# Patient Record
Sex: MALE | Race: WHITE | Employment: FULL TIME | URBAN - METROPOLITAN AREA
[De-identification: names, ages, dates, MRNs, and addresses within clinical notes are randomized per-mention and may not be internally consistent; named-entity substitution may affect disease eponyms.]

---

## 2017-01-16 PROBLEM — E78.2 MIXED HYPERLIPIDEMIA: Status: ACTIVE | Noted: 2017-01-16

## 2017-09-07 ENCOUNTER — OFFICE VISIT (OUTPATIENT)
Dept: FAMILY MEDICINE CLINIC | Age: 62
End: 2017-09-07

## 2017-09-07 VITALS
HEART RATE: 62 BPM | HEIGHT: 74 IN | SYSTOLIC BLOOD PRESSURE: 122 MMHG | WEIGHT: 186 LBS | BODY MASS INDEX: 23.87 KG/M2 | DIASTOLIC BLOOD PRESSURE: 80 MMHG | OXYGEN SATURATION: 97 %

## 2017-09-07 DIAGNOSIS — Z00.00 WELL ADULT EXAM: Primary | ICD-10-CM

## 2017-09-07 DIAGNOSIS — E78.2 MIXED HYPERLIPIDEMIA: ICD-10-CM

## 2017-09-07 DIAGNOSIS — Z12.5 PROSTATE CANCER SCREENING: ICD-10-CM

## 2017-09-07 LAB
A/G RATIO: 1.7 (ref 1.1–2.2)
ALBUMIN SERPL-MCNC: 4.4 G/DL (ref 3.4–5)
ALP BLD-CCNC: 71 U/L (ref 40–129)
ALT SERPL-CCNC: 28 U/L (ref 10–40)
ANION GAP SERPL CALCULATED.3IONS-SCNC: 13 MMOL/L (ref 3–16)
AST SERPL-CCNC: 19 U/L (ref 15–37)
BASOPHILS ABSOLUTE: 0.1 K/UL (ref 0–0.2)
BASOPHILS RELATIVE PERCENT: 0.8 %
BILIRUB SERPL-MCNC: 0.5 MG/DL (ref 0–1)
BILIRUBIN, POC: 0
BLOOD URINE, POC: NORMAL
BUN BLDV-MCNC: 15 MG/DL (ref 7–20)
CALCIUM SERPL-MCNC: 9.5 MG/DL (ref 8.3–10.6)
CHLORIDE BLD-SCNC: 102 MMOL/L (ref 99–110)
CHOLESTEROL, TOTAL: 227 MG/DL (ref 0–199)
CLARITY, POC: CLEAR
CO2: 25 MMOL/L (ref 21–32)
COLOR, POC: YELLOW
CREAT SERPL-MCNC: 0.7 MG/DL (ref 0.8–1.3)
EOSINOPHILS ABSOLUTE: 0.1 K/UL (ref 0–0.6)
EOSINOPHILS RELATIVE PERCENT: 1 %
GFR AFRICAN AMERICAN: >60
GFR NON-AFRICAN AMERICAN: >60
GLOBULIN: 2.6 G/DL
GLUCOSE BLD-MCNC: 80 MG/DL (ref 70–99)
GLUCOSE URINE, POC: 0
HCT VFR BLD CALC: 49.7 % (ref 40.5–52.5)
HDLC SERPL-MCNC: 51 MG/DL (ref 40–60)
HEMOGLOBIN: 16.7 G/DL (ref 13.5–17.5)
KETONES, POC: 0
LDL CHOLESTEROL CALCULATED: 142 MG/DL
LEUKOCYTE EST, POC: 0
LYMPHOCYTES ABSOLUTE: 2 K/UL (ref 1–5.1)
LYMPHOCYTES RELATIVE PERCENT: 32.2 %
MCH RBC QN AUTO: 29.4 PG (ref 26–34)
MCHC RBC AUTO-ENTMCNC: 33.6 G/DL (ref 31–36)
MCV RBC AUTO: 87.6 FL (ref 80–100)
MONOCYTES ABSOLUTE: 0.6 K/UL (ref 0–1.3)
MONOCYTES RELATIVE PERCENT: 8.8 %
NEUTROPHILS ABSOLUTE: 3.6 K/UL (ref 1.7–7.7)
NEUTROPHILS RELATIVE PERCENT: 57.2 %
NITRITE, POC: 0
PDW BLD-RTO: 14.4 % (ref 12.4–15.4)
PH, POC: 5
PLATELET # BLD: 215 K/UL (ref 135–450)
PMV BLD AUTO: 8.9 FL (ref 5–10.5)
POTASSIUM SERPL-SCNC: 4.5 MMOL/L (ref 3.5–5.1)
PROSTATE SPECIFIC ANTIGEN: 2.74 NG/ML (ref 0–4)
PROTEIN, POC: 0
RBC # BLD: 5.68 M/UL (ref 4.2–5.9)
SODIUM BLD-SCNC: 140 MMOL/L (ref 136–145)
SPECIFIC GRAVITY, POC: 1.02
TOTAL PROTEIN: 7 G/DL (ref 6.4–8.2)
TRIGL SERPL-MCNC: 169 MG/DL (ref 0–150)
UROBILINOGEN, POC: 0.2
VLDLC SERPL CALC-MCNC: 34 MG/DL
WBC # BLD: 6.3 K/UL (ref 4–11)

## 2017-09-07 PROCEDURE — 81002 URINALYSIS NONAUTO W/O SCOPE: CPT | Performed by: FAMILY MEDICINE

## 2017-09-07 PROCEDURE — 36415 COLL VENOUS BLD VENIPUNCTURE: CPT | Performed by: FAMILY MEDICINE

## 2017-09-07 PROCEDURE — 99396 PREV VISIT EST AGE 40-64: CPT | Performed by: FAMILY MEDICINE

## 2017-09-07 RX ORDER — ATORVASTATIN CALCIUM 20 MG/1
20 TABLET, FILM COATED ORAL NIGHTLY
Qty: 30 TABLET | Refills: 12 | Status: SHIPPED | OUTPATIENT
Start: 2017-09-07 | End: 2018-09-13 | Stop reason: SDUPTHER

## 2017-09-07 ASSESSMENT — PATIENT HEALTH QUESTIONNAIRE - PHQ9
1. LITTLE INTEREST OR PLEASURE IN DOING THINGS: 0
SUM OF ALL RESPONSES TO PHQ9 QUESTIONS 1 & 2: 0
2. FEELING DOWN, DEPRESSED OR HOPELESS: 0
SUM OF ALL RESPONSES TO PHQ QUESTIONS 1-9: 0

## 2017-09-08 ASSESSMENT — ENCOUNTER SYMPTOMS
EYE PAIN: 0
VOMITING: 0
SHORTNESS OF BREATH: 0
ABDOMINAL PAIN: 0
DIARRHEA: 0
COUGH: 0

## 2018-09-13 ENCOUNTER — OFFICE VISIT (OUTPATIENT)
Dept: FAMILY MEDICINE CLINIC | Age: 63
End: 2018-09-13

## 2018-09-13 VITALS
BODY MASS INDEX: 24.87 KG/M2 | SYSTOLIC BLOOD PRESSURE: 114 MMHG | HEIGHT: 72 IN | DIASTOLIC BLOOD PRESSURE: 60 MMHG | WEIGHT: 183.6 LBS | HEART RATE: 64 BPM

## 2018-09-13 DIAGNOSIS — Z00.00 WELL ADULT EXAM: Primary | ICD-10-CM

## 2018-09-13 DIAGNOSIS — E78.2 MIXED HYPERLIPIDEMIA: ICD-10-CM

## 2018-09-13 DIAGNOSIS — Z98.890 S/P MVR (MITRAL VALVE REPAIR): ICD-10-CM

## 2018-09-13 LAB
A/G RATIO: 2 (ref 1.1–2.2)
ALBUMIN SERPL-MCNC: 4.5 G/DL (ref 3.4–5)
ALP BLD-CCNC: 67 U/L (ref 40–129)
ALT SERPL-CCNC: 27 U/L (ref 10–40)
ANION GAP SERPL CALCULATED.3IONS-SCNC: 15 MMOL/L (ref 3–16)
AST SERPL-CCNC: 20 U/L (ref 15–37)
BASOPHILS ABSOLUTE: 0.1 K/UL (ref 0–0.2)
BASOPHILS RELATIVE PERCENT: 1.2 %
BILIRUB SERPL-MCNC: 0.5 MG/DL (ref 0–1)
BILIRUBIN, POC: 0
BLOOD URINE, POC: NORMAL
BUN BLDV-MCNC: 20 MG/DL (ref 7–20)
CALCIUM SERPL-MCNC: 9.6 MG/DL (ref 8.3–10.6)
CHLORIDE BLD-SCNC: 106 MMOL/L (ref 99–110)
CHOLESTEROL, TOTAL: 134 MG/DL (ref 0–199)
CLARITY, POC: CLEAR
CO2: 24 MMOL/L (ref 21–32)
COLOR, POC: YELLOW
CREAT SERPL-MCNC: 0.8 MG/DL (ref 0.8–1.3)
EOSINOPHILS ABSOLUTE: 0.2 K/UL (ref 0–0.6)
EOSINOPHILS RELATIVE PERCENT: 2.3 %
GFR AFRICAN AMERICAN: >60
GFR NON-AFRICAN AMERICAN: >60
GLOBULIN: 2.2 G/DL
GLUCOSE BLD-MCNC: 83 MG/DL (ref 70–99)
GLUCOSE URINE, POC: 0
HCT VFR BLD CALC: 48 % (ref 40.5–52.5)
HDLC SERPL-MCNC: 53 MG/DL (ref 40–60)
HEMOGLOBIN: 15.9 G/DL (ref 13.5–17.5)
KETONES, POC: 0
LDL CHOLESTEROL CALCULATED: 57 MG/DL
LEUKOCYTE EST, POC: 0
LYMPHOCYTES ABSOLUTE: 2.1 K/UL (ref 1–5.1)
LYMPHOCYTES RELATIVE PERCENT: 30.9 %
MCH RBC QN AUTO: 29 PG (ref 26–34)
MCHC RBC AUTO-ENTMCNC: 33.2 G/DL (ref 31–36)
MCV RBC AUTO: 87.6 FL (ref 80–100)
MONOCYTES ABSOLUTE: 0.6 K/UL (ref 0–1.3)
MONOCYTES RELATIVE PERCENT: 9.6 %
NEUTROPHILS ABSOLUTE: 3.8 K/UL (ref 1.7–7.7)
NEUTROPHILS RELATIVE PERCENT: 56 %
NITRITE, POC: 0
PDW BLD-RTO: 13.8 % (ref 12.4–15.4)
PH, POC: 6
PLATELET # BLD: 184 K/UL (ref 135–450)
PMV BLD AUTO: 9 FL (ref 5–10.5)
POTASSIUM SERPL-SCNC: 4.6 MMOL/L (ref 3.5–5.1)
PROSTATE SPECIFIC ANTIGEN: 3.12 NG/ML (ref 0–4)
PROTEIN, POC: 0
RBC # BLD: 5.48 M/UL (ref 4.2–5.9)
SODIUM BLD-SCNC: 145 MMOL/L (ref 136–145)
SPECIFIC GRAVITY, POC: 1.02
TOTAL PROTEIN: 6.7 G/DL (ref 6.4–8.2)
TRIGL SERPL-MCNC: 119 MG/DL (ref 0–150)
UROBILINOGEN, POC: 0.2
VLDLC SERPL CALC-MCNC: 24 MG/DL
WBC # BLD: 6.7 K/UL (ref 4–11)

## 2018-09-13 PROCEDURE — 36415 COLL VENOUS BLD VENIPUNCTURE: CPT | Performed by: FAMILY MEDICINE

## 2018-09-13 PROCEDURE — 99396 PREV VISIT EST AGE 40-64: CPT | Performed by: FAMILY MEDICINE

## 2018-09-13 PROCEDURE — 81002 URINALYSIS NONAUTO W/O SCOPE: CPT | Performed by: FAMILY MEDICINE

## 2018-09-13 RX ORDER — ATORVASTATIN CALCIUM 20 MG/1
20 TABLET, FILM COATED ORAL NIGHTLY
Qty: 90 TABLET | Refills: 3 | Status: SHIPPED | OUTPATIENT
Start: 2018-09-13 | End: 2019-09-02 | Stop reason: SDUPTHER

## 2018-09-13 ASSESSMENT — ENCOUNTER SYMPTOMS
DIARRHEA: 0
EYE PAIN: 0
BACK PAIN: 1
CONSTIPATION: 0
COUGH: 0
ABDOMINAL PAIN: 0
SHORTNESS OF BREATH: 0
VOMITING: 0
CHEST TIGHTNESS: 0

## 2018-09-13 ASSESSMENT — PATIENT HEALTH QUESTIONNAIRE - PHQ9
SUM OF ALL RESPONSES TO PHQ9 QUESTIONS 1 & 2: 0
2. FEELING DOWN, DEPRESSED OR HOPELESS: 0
1. LITTLE INTEREST OR PLEASURE IN DOING THINGS: 0
SUM OF ALL RESPONSES TO PHQ QUESTIONS 1-9: 0
SUM OF ALL RESPONSES TO PHQ QUESTIONS 1-9: 0

## 2018-09-13 NOTE — PROGRESS NOTES
Head: Normocephalic and atraumatic. Right Ear: External ear normal.   Left Ear: External ear normal.   Mouth/Throat: Oropharynx is clear and moist. No oropharyngeal exudate. Eyes: Conjunctivae and EOM are normal. No scleral icterus. Neck: Neck supple. No tracheal deviation present. No thyromegaly present. Cardiovascular: Normal rate, regular rhythm, normal heart sounds and intact distal pulses. Pulmonary/Chest: Effort normal. No respiratory distress. Abdominal: Soft. Bowel sounds are normal. He exhibits no distension and no mass. There is no tenderness. Musculoskeletal: He exhibits no edema. Lymphadenopathy:     He has no cervical adenopathy. Neurological: He is alert and oriented to person, place, and time. Skin: Skin is warm and dry. Psychiatric: He has a normal mood and affect. Nursing note and vitals reviewed. Assessment:      Encounter Diagnoses   Name Primary?  Well adult exam Yes    Mixed hyperlipidemia     S/P MVR (mitral valve repair)          Plan:      Per orders - await results. Advise low-fat and low sweets diet, also continue regular exercise as he is doing. If labs stable, and overall feeling well, then repeat fasting office visit in 12 months, sooner as needed.         Neville Vidales MD

## 2018-09-16 DIAGNOSIS — E78.2 MIXED HYPERLIPIDEMIA: ICD-10-CM

## 2018-09-18 RX ORDER — ATORVASTATIN CALCIUM 20 MG/1
20 TABLET, FILM COATED ORAL NIGHTLY
Qty: 30 TABLET | Refills: 0 | OUTPATIENT
Start: 2018-09-18

## 2019-09-02 DIAGNOSIS — E78.2 MIXED HYPERLIPIDEMIA: ICD-10-CM

## 2019-09-03 NOTE — TELEPHONE ENCOUNTER
Familia Tony is requesting refill(s) lipitor  Last OV 9/13/18 (pertaining to medication)  LR 9/13/18 (per medication requested)  Next office visit scheduled or attempted Yes   If no, reason:  Pt is scheduled for 9/19/19

## 2019-09-04 RX ORDER — ATORVASTATIN CALCIUM 20 MG/1
TABLET, FILM COATED ORAL
Qty: 90 TABLET | Refills: 3 | Status: SHIPPED | OUTPATIENT
Start: 2019-09-04 | End: 2020-06-24 | Stop reason: SDUPTHER

## 2019-09-19 ENCOUNTER — OFFICE VISIT (OUTPATIENT)
Dept: FAMILY MEDICINE CLINIC | Age: 64
End: 2019-09-19
Payer: COMMERCIAL

## 2019-09-19 VITALS
HEIGHT: 72 IN | HEART RATE: 66 BPM | OXYGEN SATURATION: 98 % | BODY MASS INDEX: 24.76 KG/M2 | DIASTOLIC BLOOD PRESSURE: 60 MMHG | TEMPERATURE: 97.8 F | SYSTOLIC BLOOD PRESSURE: 110 MMHG | WEIGHT: 182.8 LBS

## 2019-09-19 DIAGNOSIS — Z12.5 PROSTATE CANCER SCREENING: ICD-10-CM

## 2019-09-19 DIAGNOSIS — Z00.00 WELL ADULT EXAM: Primary | ICD-10-CM

## 2019-09-19 DIAGNOSIS — Z23 NEED FOR INFLUENZA VACCINATION: ICD-10-CM

## 2019-09-19 LAB
A/G RATIO: 2.1 (ref 1.1–2.2)
ALBUMIN SERPL-MCNC: 4.5 G/DL (ref 3.4–5)
ALP BLD-CCNC: 64 U/L (ref 40–129)
ALT SERPL-CCNC: 27 U/L (ref 10–40)
ANION GAP SERPL CALCULATED.3IONS-SCNC: 13 MMOL/L (ref 3–16)
AST SERPL-CCNC: 22 U/L (ref 15–37)
BASOPHILS ABSOLUTE: 0.1 K/UL (ref 0–0.2)
BASOPHILS RELATIVE PERCENT: 1.2 %
BILIRUB SERPL-MCNC: 0.9 MG/DL (ref 0–1)
BILIRUBIN, POC: 0
BLOOD URINE, POC: NORMAL
BUN BLDV-MCNC: 18 MG/DL (ref 7–20)
CALCIUM SERPL-MCNC: 9.4 MG/DL (ref 8.3–10.6)
CHLORIDE BLD-SCNC: 105 MMOL/L (ref 99–110)
CHOLESTEROL, TOTAL: 137 MG/DL (ref 0–199)
CLARITY, POC: CLEAR
CO2: 24 MMOL/L (ref 21–32)
COLOR, POC: YELLOW
CREAT SERPL-MCNC: 0.9 MG/DL (ref 0.8–1.3)
EOSINOPHILS ABSOLUTE: 0.1 K/UL (ref 0–0.6)
EOSINOPHILS RELATIVE PERCENT: 1.5 %
GFR AFRICAN AMERICAN: >60
GFR NON-AFRICAN AMERICAN: >60
GLOBULIN: 2.1 G/DL
GLUCOSE BLD-MCNC: 79 MG/DL (ref 70–99)
GLUCOSE URINE, POC: 0
HCT VFR BLD CALC: 47.3 % (ref 40.5–52.5)
HDLC SERPL-MCNC: 53 MG/DL (ref 40–60)
HEMOGLOBIN: 15.8 G/DL (ref 13.5–17.5)
KETONES, POC: 0
LDL CHOLESTEROL CALCULATED: 65 MG/DL
LEUKOCYTE EST, POC: 0
LYMPHOCYTES ABSOLUTE: 2 K/UL (ref 1–5.1)
LYMPHOCYTES RELATIVE PERCENT: 32.8 %
MCH RBC QN AUTO: 29.2 PG (ref 26–34)
MCHC RBC AUTO-ENTMCNC: 33.4 G/DL (ref 31–36)
MCV RBC AUTO: 87.3 FL (ref 80–100)
MONOCYTES ABSOLUTE: 0.7 K/UL (ref 0–1.3)
MONOCYTES RELATIVE PERCENT: 11.4 %
NEUTROPHILS ABSOLUTE: 3.2 K/UL (ref 1.7–7.7)
NEUTROPHILS RELATIVE PERCENT: 53.1 %
NITRITE, POC: 0
PDW BLD-RTO: 13.7 % (ref 12.4–15.4)
PH, POC: 5.5
PLATELET # BLD: 194 K/UL (ref 135–450)
PMV BLD AUTO: 8.7 FL (ref 5–10.5)
POTASSIUM SERPL-SCNC: 4.2 MMOL/L (ref 3.5–5.1)
PROSTATE SPECIFIC ANTIGEN: 2.87 NG/ML (ref 0–4)
PROTEIN, POC: 0
RBC # BLD: 5.42 M/UL (ref 4.2–5.9)
SODIUM BLD-SCNC: 142 MMOL/L (ref 136–145)
SPECIFIC GRAVITY, POC: 1.02
TOTAL PROTEIN: 6.6 G/DL (ref 6.4–8.2)
TRIGL SERPL-MCNC: 95 MG/DL (ref 0–150)
UROBILINOGEN, POC: 0.2
VLDLC SERPL CALC-MCNC: 19 MG/DL
WBC # BLD: 6.1 K/UL (ref 4–11)

## 2019-09-19 PROCEDURE — 81002 URINALYSIS NONAUTO W/O SCOPE: CPT | Performed by: FAMILY MEDICINE

## 2019-09-19 PROCEDURE — 90471 IMMUNIZATION ADMIN: CPT | Performed by: FAMILY MEDICINE

## 2019-09-19 PROCEDURE — 99396 PREV VISIT EST AGE 40-64: CPT | Performed by: FAMILY MEDICINE

## 2019-09-19 PROCEDURE — 90686 IIV4 VACC NO PRSV 0.5 ML IM: CPT | Performed by: FAMILY MEDICINE

## 2019-09-19 PROCEDURE — 36415 COLL VENOUS BLD VENIPUNCTURE: CPT | Performed by: FAMILY MEDICINE

## 2019-09-19 ASSESSMENT — PATIENT HEALTH QUESTIONNAIRE - PHQ9
2. FEELING DOWN, DEPRESSED OR HOPELESS: 0
SUM OF ALL RESPONSES TO PHQ QUESTIONS 1-9: 0
SUM OF ALL RESPONSES TO PHQ9 QUESTIONS 1 & 2: 0
1. LITTLE INTEREST OR PLEASURE IN DOING THINGS: 0
SUM OF ALL RESPONSES TO PHQ QUESTIONS 1-9: 0

## 2019-09-19 ASSESSMENT — ENCOUNTER SYMPTOMS
BACK PAIN: 1
COUGH: 0
EYE PAIN: 0
ABDOMINAL PAIN: 0
DIARRHEA: 0
SHORTNESS OF BREATH: 0

## 2019-09-19 NOTE — PROGRESS NOTES
Subjective:      Patient ID: Sully Brown is a 59 y.o. male. HPI   Here for CPE. Only concern is last year, some L flank pain, told could be related to OA, and sometimes L facial tingling, not numb, and sometimes gets L lower back, and sometimes L UE sensation, not a real pain, not definite numbness/tingling. No meds for it - usually less sxs when active. Trying to stay active. Energy level good, and sleeping ok overall, sometimes worse then other nights. Diet - lowfat, likes veggies, low sodium, salads, fish, little beef, more chicken, few sweets, no pop. Exercise -  Not much lately, but wife is quite busy,  at [employer], done by late Sept, so he's now cooking for them both, but will then get back into exercise  - more walking and back/other exercises, some wts too, restart elliptical.  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 Patient sees Dr. Rik Saavedra in Jan, had echo this year. Younger son at [facility], to be done by early next year (?).  12/14/15 - Celine. Review of Systems   Constitutional: Negative for chills, fatigue and fever. HENT: Negative for ear discharge and ear pain. Eyes: Negative for pain and visual disturbance. Respiratory: Negative for cough and shortness of breath. Cardiovascular: Negative for chest pain and palpitations. Gastrointestinal: Negative for abdominal pain and diarrhea. Genitourinary: Negative for difficulty urinating and dysuria. Musculoskeletal: Positive for back pain. Negative for gait problem. Neurological: Positive for numbness (tingling L face, see HPI). Negative for dizziness and weakness. Psychiatric/Behavioral: Negative for dysphoric mood and sleep disturbance. The patient is not nervous/anxious. Objective:   Physical Exam   Constitutional: He is oriented to person, place, and time. He appears well-developed and well-nourished.    HENT:   Head:

## 2019-09-19 NOTE — PROGRESS NOTES
Vaccine Information Sheet, \"Influenza - Inactivated\"  given to Federica Archer, or parent/legal guardian of  Federica Archer and verbalized understanding. Patient responses:    Have you ever had a reaction to a flu vaccine? No  Are you able to eat eggs without adverse effects? Yes  Do you have any current illness? No  Have you ever had Guillian [place]open Syndrome? No    Flu vaccine given per order. Please see immunization tab.

## 2020-01-10 ENCOUNTER — TELEPHONE (OUTPATIENT)
Dept: FAMILY MEDICINE CLINIC | Age: 65
End: 2020-01-10

## 2020-06-24 ENCOUNTER — OFFICE VISIT (OUTPATIENT)
Dept: FAMILY MEDICINE CLINIC | Age: 65
End: 2020-06-24
Payer: COMMERCIAL

## 2020-06-24 VITALS
DIASTOLIC BLOOD PRESSURE: 78 MMHG | WEIGHT: 184.8 LBS | TEMPERATURE: 97.9 F | HEART RATE: 70 BPM | BODY MASS INDEX: 24.49 KG/M2 | HEIGHT: 73 IN | SYSTOLIC BLOOD PRESSURE: 130 MMHG | OXYGEN SATURATION: 98 %

## 2020-06-24 LAB
A/G RATIO: 1.8 (ref 1.1–2.2)
ALBUMIN SERPL-MCNC: 4.2 G/DL (ref 3.4–5)
ALP BLD-CCNC: 70 U/L (ref 40–129)
ALT SERPL-CCNC: 25 U/L (ref 10–40)
ANION GAP SERPL CALCULATED.3IONS-SCNC: 11 MMOL/L (ref 3–16)
AST SERPL-CCNC: 17 U/L (ref 15–37)
BILIRUB SERPL-MCNC: 0.7 MG/DL (ref 0–1)
BUN BLDV-MCNC: 20 MG/DL (ref 7–20)
CALCIUM SERPL-MCNC: 9.4 MG/DL (ref 8.3–10.6)
CHLORIDE BLD-SCNC: 102 MMOL/L (ref 99–110)
CHOLESTEROL, TOTAL: 134 MG/DL (ref 0–199)
CO2: 23 MMOL/L (ref 21–32)
CREAT SERPL-MCNC: 0.8 MG/DL (ref 0.8–1.3)
GFR AFRICAN AMERICAN: >60
GFR NON-AFRICAN AMERICAN: >60
GLOBULIN: 2.3 G/DL
GLUCOSE BLD-MCNC: 86 MG/DL (ref 70–99)
HDLC SERPL-MCNC: 51 MG/DL (ref 40–60)
LDL CHOLESTEROL CALCULATED: 57 MG/DL
POTASSIUM SERPL-SCNC: 4.3 MMOL/L (ref 3.5–5.1)
SODIUM BLD-SCNC: 136 MMOL/L (ref 136–145)
TOTAL PROTEIN: 6.5 G/DL (ref 6.4–8.2)
TRIGL SERPL-MCNC: 131 MG/DL (ref 0–150)
VLDLC SERPL CALC-MCNC: 26 MG/DL

## 2020-06-24 PROCEDURE — 99213 OFFICE O/P EST LOW 20 MIN: CPT | Performed by: FAMILY MEDICINE

## 2020-06-24 PROCEDURE — 90732 PPSV23 VACC 2 YRS+ SUBQ/IM: CPT | Performed by: FAMILY MEDICINE

## 2020-06-24 PROCEDURE — 90471 IMMUNIZATION ADMIN: CPT | Performed by: FAMILY MEDICINE

## 2020-06-24 RX ORDER — ATORVASTATIN CALCIUM 20 MG/1
TABLET, FILM COATED ORAL
Qty: 90 TABLET | Refills: 3 | Status: SHIPPED | OUTPATIENT
Start: 2020-06-24 | End: 2021-06-03

## 2020-06-24 ASSESSMENT — ENCOUNTER SYMPTOMS
SHORTNESS OF BREATH: 0
SINUS PRESSURE: 0
BLOOD IN STOOL: 0
COUGH: 0
EYE PAIN: 0
EYE DISCHARGE: 0
ABDOMINAL PAIN: 0
VOMITING: 0
DIARRHEA: 0
CHEST TIGHTNESS: 0
RHINORRHEA: 0
WHEEZING: 0
CONSTIPATION: 0

## 2020-06-24 ASSESSMENT — PATIENT HEALTH QUESTIONNAIRE - PHQ9
2. FEELING DOWN, DEPRESSED OR HOPELESS: 0
SUM OF ALL RESPONSES TO PHQ9 QUESTIONS 1 & 2: 0
SUM OF ALL RESPONSES TO PHQ QUESTIONS 1-9: 0
SUM OF ALL RESPONSES TO PHQ QUESTIONS 1-9: 0
1. LITTLE INTEREST OR PLEASURE IN DOING THINGS: 0

## 2020-06-24 NOTE — PROGRESS NOTES
Subjective:      Patient ID: Tarik Pyle is a 72 y.o. male. HPI  Chief Complaint   Patient presents with   Lisandra Vaughn Doctor     Patient is here to establish care with , was previously seeing .  Hyperlipidemia     Patient is fasting for blood work. For cholesterol checkup. States taking his medicine daily without any problems. Denies any muscle pain or rash. No weakness. Sees his cardiologist once a year. Denies any chest pain shortness of breath or syncope  Tarik Pyle is a 72 y.o. male with the following history as recorded in EpicCare:  Patient Active Problem List    Diagnosis Date Noted    Mixed hyperlipidemia 01/16/2017    S/P MVR (mitral valve repair) 06/30/2014     Current Outpatient Medications   Medication Sig Dispense Refill    atorvastatin (LIPITOR) 20 MG tablet TAKE 1 TABLET BY MOUTH EVERY NIGHT FOR HIGH CHOLESTEROL 90 tablet 3    aspirin 81 MG tablet Take 81 mg by mouth daily.  Multiple Vitamins-Minerals (THERAPEUTIC MULTIVITAMIN-MINERALS) tablet Take 1 tablet by mouth daily. No current facility-administered medications for this visit. Allergies: Patient has no known allergies. Past Medical History:   Diagnosis Date    Adenomatous colon polyp 2009    Dyslipidemia     History of colon polyps     Status post mitral valve repair 2006     Past Surgical History:   Procedure Laterality Date    COLONOSCOPY  12/04/09    Adenoma    FRACTURE SURGERY Right 1983    fractured humerus, plate    MITRAL VALVE SURGERY  2006    Dr. Kenny Mcpherson follows - Dr. Brianna Flores surgeon, [facility] History   Problem Relation Age of Onset    Kidney Disease Brother     Cancer Neg Hx      Social History     Tobacco Use    Smoking status: Never Smoker    Smokeless tobacco: Never Used   Substance Use Topics    Alcohol use:  Yes     Alcohol/week: 0.0 standard drinks     Comment: rarely - 2-4/month     Vitals:    06/24/20 0831   BP: 130/78   Pulse: 70   Temp: distension. Palpations: Abdomen is soft. Tenderness: There is no abdominal tenderness. There is no guarding or rebound. Musculoskeletal: Normal range of motion. Lymphadenopathy:      Cervical: No cervical adenopathy. Skin:     General: Skin is warm. Neurological:      Mental Status: He is alert and oriented to person, place, and time. Psychiatric:         Behavior: Behavior normal.         Thought Content:  Thought content normal.         Judgment: Judgment normal.         Assessment:      hld- check lipids      Plan:      Orders Placed This Encounter   Procedures    PNEUMOVAX 23 subcutaneous/IM (Pneumococcal polysaccharide vaccine 23-valent >= 3yo)    LIPID PANEL     Order Specific Question:   Is Patient Fasting?/# of Hours     Answer:   12    COMPREHENSIVE METABOLIC PANEL     Orders Placed This Encounter   Medications    atorvastatin (LIPITOR) 20 MG tablet     Sig: TAKE 1 TABLET BY MOUTH EVERY NIGHT FOR HIGH CHOLESTEROL     Dispense:  90 tablet     Refill:  3     rto 3 month INDIGO [facility] [address] RACH, DO

## 2020-10-01 ENCOUNTER — OFFICE VISIT (OUTPATIENT)
Dept: FAMILY MEDICINE CLINIC | Age: 65
End: 2020-10-01
Payer: COMMERCIAL

## 2020-10-01 VITALS
HEIGHT: 72 IN | HEART RATE: 63 BPM | OXYGEN SATURATION: 99 % | TEMPERATURE: 97.5 F | DIASTOLIC BLOOD PRESSURE: 74 MMHG | SYSTOLIC BLOOD PRESSURE: 120 MMHG | WEIGHT: 181.8 LBS | BODY MASS INDEX: 24.62 KG/M2

## 2020-10-01 LAB — PROSTATE SPECIFIC ANTIGEN: 4.46 NG/ML (ref 0–4)

## 2020-10-01 PROCEDURE — 99397 PER PM REEVAL EST PAT 65+ YR: CPT | Performed by: FAMILY MEDICINE

## 2020-10-01 PROCEDURE — 90471 IMMUNIZATION ADMIN: CPT | Performed by: FAMILY MEDICINE

## 2020-10-01 PROCEDURE — 36415 COLL VENOUS BLD VENIPUNCTURE: CPT | Performed by: FAMILY MEDICINE

## 2020-10-01 PROCEDURE — 90694 VACC AIIV4 NO PRSRV 0.5ML IM: CPT | Performed by: FAMILY MEDICINE

## 2020-10-01 ASSESSMENT — ENCOUNTER SYMPTOMS
CHEST TIGHTNESS: 0
EYE REDNESS: 0
EYE PAIN: 0
SHORTNESS OF BREATH: 0
EYE DISCHARGE: 0
WHEEZING: 0
APNEA: 0
NAUSEA: 0
BLOOD IN STOOL: 0
COUGH: 0
SINUS PAIN: 0
ABDOMINAL PAIN: 0
ABDOMINAL DISTENTION: 0
SORE THROAT: 0
SINUS PRESSURE: 0
COLOR CHANGE: 0
CONSTIPATION: 0

## 2020-10-01 NOTE — PROGRESS NOTES
Vaccine Information Sheet, \"Influenza - Inactivated\"  given to Gatjose Shall, or parent/legal guardian of  Juan Shall and verbalized understanding. Patient responses:    Have you ever had a reaction to a flu vaccine? No  Do you have any current illness? No  Have you ever had Guillian [place]ning[place] Syndrome? No  Do you have a serious allergy to any of the follow: Neomycin, Polymyxin, Thimerosal, eggs or egg products? No    Flu vaccine given per order. Please see immunization tab. Risks and benefits explained. Current VIS given.

## 2020-10-01 NOTE — PROGRESS NOTES
Subjective:      Patient ID: Bushra Victor is a 72 y.o. male. HPI   Chief Complaint   Patient presents with    Annual Exam     Pt is here for physical and FBW. Flu shot    Jhony Levine is a 72year old male presenting to clinic for a wellness visit and blood work. He has no acute complaints today. He has a history of hypercholesterolemia which is well controlled on lipitor. He states his last colonoscopy was five years ago and that he is going to call and schedule another one soon. He has no vision issues and does not see an eye doctor. He is up to date on his dental exam. He is fairly active and goes on daily walks. He also maintains a healthy diet and restricts his sodium intake. He saw cardiology in January with no abnormal findings. No new chest pain or shortness of breath. Bushra Victor is a 72 y.o. male with the following history as recorded in EpicCare:  Patient Active Problem List    Diagnosis Date Noted    Mixed hyperlipidemia 01/16/2017    S/P MVR (mitral valve repair) 06/30/2014     Current Outpatient Medications   Medication Sig Dispense Refill    atorvastatin (LIPITOR) 20 MG tablet TAKE 1 TABLET BY MOUTH EVERY NIGHT FOR HIGH CHOLESTEROL 90 tablet 3    aspirin 81 MG tablet Take 81 mg by mouth daily.  Multiple Vitamins-Minerals (THERAPEUTIC MULTIVITAMIN-MINERALS) tablet Take 1 tablet by mouth daily. No current facility-administered medications for this visit. Allergies: Patient has no known allergies.   Past Medical History:   Diagnosis Date    Adenomatous colon polyp 2009    Dyslipidemia     History of colon polyps     Status post mitral valve repair 2006     Past Surgical History:   Procedure Laterality Date    COLONOSCOPY  12/04/09    Adenoma    FRACTURE SURGERY Right 1983    fractured humerus, plate    MITRAL VALVE SURGERY  2006    Dr. Damian Sites follows - Dr. Aidee Mcgee surgeon, at [facility] History   Problem Relation Age of Onset    Kidney Disease Brother    [facility] Cancer Neg Hx      Social History     Tobacco Use    Smoking status: Never Smoker    Smokeless tobacco: Never Used   Substance Use Topics    Alcohol use: Yes     Alcohol/week: 0.0 standard drinks     Comment: rarely - 2-4/month     Vitals:    10/01/20 1021   BP: 120/74   Site: Right Upper Arm   Position: Sitting   Pulse: 63   Temp: 97.5 °F (36.4 °C)   SpO2: 99%   Weight: 181 lb 12.8 oz (82.5 kg)   Height: 5' 11.5\" (1.816 m)     Body mass index is 25 kg/m². Wt Readings from Last 3 Encounters:   10/01/20 181 lb 12.8 oz (82.5 kg)   06/24/20 184 lb 12.8 oz (83.8 kg)   09/19/19 182 lb 12.8 oz (82.9 kg)     BP Readings from Last 3 Encounters:   10/01/20 120/74   06/24/20 130/78   09/19/19 110/60      Lab Review   Office Visit on 06/24/2020   Component Date Value    Cholesterol, Total 06/24/2020 134     Triglycerides 06/24/2020 131     HDL 06/24/2020 51     LDL Calculated 06/24/2020 57     VLDL Cholesterol Calcula* 06/24/2020 26     Sodium 06/24/2020 136     Potassium 06/24/2020 4.3     Chloride 06/24/2020 102     CO2 06/24/2020 23     Anion Gap 06/24/2020 11     Glucose 06/24/2020 86     BUN 06/24/2020 20     CREATININE 06/24/2020 0.8     GFR Non- 06/24/2020 >60     GFR  06/24/2020 >60     Calcium 06/24/2020 9.4     Total Protein 06/24/2020 6.5     Alb 06/24/2020 4.2     Albumin/Globulin Ratio 06/24/2020 1.8     Total Bilirubin 06/24/2020 0.7     Alkaline Phosphatase 06/24/2020 70     ALT 06/24/2020 25     AST 06/24/2020 17     Globulin 06/24/2020 2.3        Review of Systems   Constitutional: Negative for activity change, chills, diaphoresis and fatigue. HENT: Negative for dental problem, ear discharge, ear pain, hearing loss, sinus pressure, sinus pain, sore throat and tinnitus. Eyes: Negative for pain, discharge and redness. Respiratory: Negative for apnea, cough, chest tightness, shortness of breath and wheezing.     Cardiovascular: Negative for jaundiced. Findings: No bruising. Neurological:      General: No focal deficit present. Mental Status: He is alert. Motor: No weakness. Gait: Gait normal.   Psychiatric:         Mood and Affect: Mood normal.         Behavior: Behavior normal.         Assessment:       CPE  Hypercholesterolemia-well controlled on lipitor  Mitral valve repair-on asprin therapy for       Plan:       Patient Counseling:  --Nutrition: Stressed importance of moderation in sodium/caffeine intake, saturated fat and cholesterol, caloric balance, sufficient intake of fresh fruits, vegetables, fiber, calcium, iron, and 1 mg of folate supplement per day (for females capable of pregnancy). --Exercise: Stressed the importance of regular exercise. --Dental health: Discussed importance of regular tooth brushing, flossing, and dental visits. --Immunizations reviewed. Daily sunscreen recommended. Yearly FSE discussed  --Discussed benefits of screening colonoscopy.       Continue statin therapy  Administer influenza vaccine   PSA blood test  Orders Placed This Encounter   Procedures    INFLUENZA, QUADV, ADJUVANTED, 65 YRS =, IM, PF, PREFILL SYR, 0.5ML (FLUAD)    Psa screening             MUNA LOYD DO

## 2020-10-01 NOTE — PATIENT INSTRUCTIONS

## 2020-10-05 NOTE — RESULT ENCOUNTER NOTE
Left message for patient to call back. Referral placed for [facility], please give phone number for scheduling.

## 2020-11-18 ENCOUNTER — OFFICE VISIT (OUTPATIENT)
Dept: FAMILY MEDICINE CLINIC | Age: 65
End: 2020-11-18
Payer: COMMERCIAL

## 2020-11-18 VITALS
OXYGEN SATURATION: 99 % | HEART RATE: 52 BPM | WEIGHT: 186.2 LBS | TEMPERATURE: 94.9 F | SYSTOLIC BLOOD PRESSURE: 128 MMHG | HEIGHT: 72 IN | DIASTOLIC BLOOD PRESSURE: 72 MMHG | BODY MASS INDEX: 25.22 KG/M2

## 2020-11-18 PROBLEM — R97.20 ELEVATED PSA: Status: ACTIVE | Noted: 2020-11-18

## 2020-11-18 PROBLEM — N40.0 ENLARGED PROSTATE: Status: ACTIVE | Noted: 2020-11-18

## 2020-11-18 PROCEDURE — 99242 OFF/OP CONSLTJ NEW/EST SF 20: CPT | Performed by: FAMILY MEDICINE

## 2020-11-18 ASSESSMENT — ENCOUNTER SYMPTOMS
ABDOMINAL PAIN: 0
BLOOD IN STOOL: 0
DIARRHEA: 0
SHORTNESS OF BREATH: 0
WHEEZING: 0
RHINORRHEA: 0
VOMITING: 0
EYE DISCHARGE: 0
COUGH: 0
SINUS PRESSURE: 0
CHEST TIGHTNESS: 0
EYE PAIN: 0
CONSTIPATION: 0

## 2020-11-18 NOTE — PROGRESS NOTES
3.2 oz (84.5 kg)   Height: 5' 11.5\" (1.816 m)     Body mass index is 25.61 kg/m². Wt Readings from Last 3 Encounters:   11/18/20 186 lb 3.2 oz (84.5 kg)   10/01/20 181 lb 12.8 oz (82.5 kg)   06/24/20 184 lb 12.8 oz (83.8 kg)     BP Readings from Last 3 Encounters:   11/18/20 128/72   10/01/20 120/74   06/24/20 130/78        Review of Systems   Constitutional: Negative for fatigue, fever and unexpected weight change. HENT: Negative for congestion, ear discharge, ear pain, hearing loss, rhinorrhea and sinus pressure. Eyes: Negative for pain, discharge and visual disturbance. Respiratory: Negative for cough, chest tightness, shortness of breath and wheezing. Cardiovascular: Negative for chest pain, palpitations and leg swelling. Gastrointestinal: Negative for abdominal pain, blood in stool, constipation, diarrhea and vomiting. Genitourinary: Negative for difficulty urinating, dysuria and hematuria. Neurological: Negative for dizziness, seizures, syncope and headaches. Psychiatric/Behavioral: Negative for dysphoric mood and sleep disturbance. The patient is not nervous/anxious. Objective:   Physical Exam  Constitutional:       General: He is not in acute distress. Appearance: He is well-developed. HENT:      Head: Normocephalic. Right Ear: External ear normal.      Left Ear: External ear normal.      Nose: Nose normal.      Mouth/Throat:      Pharynx: No oropharyngeal exudate. Eyes:      Pupils: Pupils are equal, round, and reactive to light. Neck:      Musculoskeletal: Neck supple. Thyroid: No thyromegaly. Cardiovascular:      Rate and Rhythm: Normal rate and regular rhythm. Heart sounds: Normal heart sounds. No murmur. Pulmonary:      Effort: Pulmonary effort is normal.      Breath sounds: Normal breath sounds. No wheezing. Abdominal:      General: Bowel sounds are normal. There is no distension. Palpations: Abdomen is soft. Tenderness:  There is no

## 2021-03-05 ENCOUNTER — IMMUNIZATION (OUTPATIENT)
Dept: PRIMARY CARE CLINIC | Age: 66
End: 2021-03-05

## 2021-04-02 ENCOUNTER — IMMUNIZATION (OUTPATIENT)
Dept: PRIMARY CARE CLINIC | Age: 66
End: 2021-04-02
Payer: COMMERCIAL

## 2021-04-02 PROCEDURE — 91301 COVID-19, MODERNA VACCINE 100MCG/0.5ML DOSE: CPT | Performed by: FAMILY MEDICINE

## 2021-04-02 PROCEDURE — 0012A COVID-19, MODERNA VACCINE 100MCG/0.5ML DOSE: CPT | Performed by: FAMILY MEDICINE

## 2021-06-03 DIAGNOSIS — E78.2 MIXED HYPERLIPIDEMIA: ICD-10-CM

## 2021-06-03 RX ORDER — ATORVASTATIN CALCIUM 20 MG/1
TABLET, FILM COATED ORAL
Qty: 90 TABLET | Refills: 1 | Status: SHIPPED | OUTPATIENT
Start: 2021-06-03 | End: 2021-11-22 | Stop reason: SDUPTHER

## 2021-06-03 NOTE — TELEPHONE ENCOUNTER
Nonda Calender is requesting refill(s) lipitor  Last OV 10/1/20 (pertaining to medication)  LR 6/24/20 (per medication requested)  Next office visit scheduled or attempted Yes   If no, reason:  10/5/21

## 2021-06-07 ENCOUNTER — OFFICE VISIT (OUTPATIENT)
Dept: ORTHOPEDIC SURGERY | Age: 66
End: 2021-06-07
Payer: COMMERCIAL

## 2021-06-07 ENCOUNTER — TELEPHONE (OUTPATIENT)
Dept: ORTHOPEDIC SURGERY | Age: 66
End: 2021-06-07

## 2021-06-07 VITALS — BODY MASS INDEX: 24.39 KG/M2 | TEMPERATURE: 97.6 F | HEIGHT: 73 IN | WEIGHT: 184 LBS

## 2021-06-07 DIAGNOSIS — M54.12 CERVICAL RADICULOPATHY: Primary | ICD-10-CM

## 2021-06-07 DIAGNOSIS — M25.511 RIGHT SHOULDER PAIN, UNSPECIFIED CHRONICITY: ICD-10-CM

## 2021-06-07 DIAGNOSIS — M54.2 NECK PAIN: ICD-10-CM

## 2021-06-07 PROCEDURE — 99204 OFFICE O/P NEW MOD 45 MIN: CPT | Performed by: ORTHOPAEDIC SURGERY

## 2021-06-07 RX ORDER — PREDNISONE 10 MG/1
TABLET ORAL
Qty: 35 TABLET | Refills: 0 | Status: SHIPPED | OUTPATIENT
Start: 2021-06-07 | End: 2021-06-22

## 2021-06-07 NOTE — PROGRESS NOTES
Date:  2021    Name:  Robbie Postal  Address:  [address] [address]    :  1955      Age:   77 y.o.    SSN:  [id number]      Medical Record Number:  [id number]    Reason for Visit:    Chief Complaint    Shoulder Pain (new patient right shoulder )      DOS:2021     HPI: Gerda Holter is a 77 y.o. male here today for right shoulder pain which has been going on for just over a week. He has not had any associated injury or falls. He describes pain in the shoulder that radiates down into both hands. He experiences some soreness in his neck as well. He experiences some numbness/tingling/burning sensation is as. He has been utilizing Aleve and heat which provides some relief. He states that he has not had similar episodes in the past.  He experiences pain at night especially, is not able to get his shoulder in a comfortable position he feels. He is right-hand dominant      Pain Assessment  Location of Pain: Shoulder  Location Modifiers: Right  Severity of Pain: 8  Quality of Pain: Sharp  Duration of Pain: Persistent  Frequency of Pain: Constant  Aggravating Factors: Other (Comment) (fast movement)  Limiting Behavior: Some  Relieving Factors: Heat  Result of Injury: No  Work-Related Injury: No  Are there other pain locations you wish to document?: No  ROS: All systems reviewed on patient intake form. Pertinent items are noted in HPI.         Past Medical History:   Diagnosis Date    Adenomatous colon polyp     Dyslipidemia     History of colon polyps     Status post mitral valve repair         Past Surgical History:   Procedure Laterality Date    COLONOSCOPY  09    Adenoma    FRACTURE SURGERY Right 1983    fractured humerus, plate    MITRAL VALVE SURGERY      Dr. Rupesh Jimenez follows - Dr. Comfort Robert surgeon, at [facility] INPATIENT History   Problem Relation Age of Onset    Kidney Disease Brother     Cancer Neg Hx        Social History     Socioeconomic History    for this visit. No Known Allergies    Vital signs:  Temp 97.6 °F (36.4 °C)   Ht 6' 1\" (1.854 m)   Wt 184 lb (83.5 kg)   BMI 24.28 kg/m²        Neuro: Alert & oriented x 3,  normal,  no focal deficits noted. Normal affect. Eyes: sclera clear  Ears: Normal external ear  Mouth:  No perioral lesions  Pulm: Respirations unlabored and regular  Pulse: Regular rate    Skin: Warm, well perfused    R shoulder exam    Inspection:  Held in a normal posture. Normal contour at the acromioclavicular joint. No swelling, ecchymosis, or erythema about the shoulder. No atrophy appreciated. No scapular winging. Palpation:  No subacromial crepitus. No tenderness of the AC joint. greater tuberosity tenderness. No tenderness in the bicipital groove. Range of Motion: Full passive and active ROM. Normal scapulothoracic rhythm. Strength:  Normal subscapularis muscle strength. 4-/5 supra and infra    Stability: No anterior instability. No posterior instability. Special Tests: Impingement findings are negative. Labral findings are negative. Speed sign and Yergason signs are both negative. Crossover sign is negative. Belly press sign is negative. Lift off sign is negative. Other findings: The skin is warm dry and well perfused. 2+ radial pulse. Sensation is intact to light touch over the deltoid. L comparison shoulder exam    Inspection:  Held in a normal posture. Normal contour at the acromioclavicular joint. No swelling, ecchymosis, or erythema about the shoulder. No atrophy appreciated. No scapular winging. Palpation:  No subacromial crepitus. No tenderness of the AC joint. No greater tuberosity tenderness. No tenderness in the bicipital groove. Range of Motion: Full passive and active ROM. Normal scapulothoracic rhythm. Strength:  Normal supraspinatus, infraspinatus, and subscapularis muscle strength. Stability: No anterior instability. No posterior instability.     Special Tests: Impingement findings are negative. Labral findings are negative. Speed sign and Yergason signs are both negative. Crossover sign is negative. Belly press sign is negative. Lift off sign is negative. Other findings: The skin is warm dry and well perfused. 2+ radial pulse. Sensation is intact to light touch over the deltoid. C spine: limited motion. Positive Spurling test.  Tenderness along the right side of the C-spine at site of exiting nerve roots            Diagnostics:  Radiology:     X-rays of the R shoulder including  Grashey,  scapular Y and  axillary views were obtained and reviewed in office:    Impression: no fractures. No dislocations. Maintained glenohumeral and acromioclavicular joint spaces. No high riding humerus     AP and lateral of the C-spine: Diffuse cervical spondylosis noted with multilevel disc space narrowing and anterior osteophytes. Additionally multilevel facet degeneration. Loss of cervical lordosis      Assessment: 71-year-old right-hand-dominant male with cervical spondylosis with R sided cervical radiculopathy      Plan:   Pertinent imaging was reviewed. The etiology, natural history, and treatment options for the disorder were discussed. The roles of activity medication, antiinflammatories, injections, bracing, physical therapy, and surgical interventions were all described to the patient and questions were answered. We believe patient is a candidate for nonoperative management    -Prescription provided for extended 15-day Medrol Dosepak  -Discussed benefit of formal physical therapy however he will be traveling for the next month  -Referral provided to [facility] spine physicians for definitive management when he returns         Ford Jiménez is in agreement with this plan. All questions were answered to patient's satisfaction and was encouraged to call with any further questions.      Total time spent for evaluation, education, and development of treatment plan: 45 minutes      Melani Black Miranda Liu 354             Orders Placed This Encounter   Procedures    XR SHOULDER RIGHT (MIN 2 VIEWS)     Order Specific Question:   Reason for exam:     Answer:   pain    XR CERVICAL SPINE (2-3 VIEWS)     Standing Status:   Future     Number of Occurrences:   1     Standing Expiration Date:   6/7/2022     Order Specific Question:   Reason for exam:     Answer:   pain   Destiny Del Rivera 4     Referral Priority:   Routine     Referral Type:   Eval and Treat     Referral Reason:   Specialty Services Required     Number of Visits Requested:   1       I attest that I met personally with the patient, performed the described exam, reviewed the radiographic studies and medical records associated with this patient and supervised the services that are described above.      Kathya Mendoza MD

## 2021-06-09 ENCOUNTER — TELEPHONE (OUTPATIENT)
Dept: ORTHOPEDIC SURGERY | Age: 66
End: 2021-06-09

## 2021-06-09 NOTE — TELEPHONE ENCOUNTER
Attempted to contact patient twice to set up an appointment with back and spine. LVM with back and spine call center number for callback at his convenience.

## 2021-07-22 ENCOUNTER — TELEPHONE (OUTPATIENT)
Dept: ORTHOPEDIC SURGERY | Age: 66
End: 2021-07-22

## 2021-07-22 ENCOUNTER — OFFICE VISIT (OUTPATIENT)
Dept: ORTHOPEDIC SURGERY | Age: 66
End: 2021-07-22
Payer: COMMERCIAL

## 2021-07-22 VITALS — WEIGHT: 184 LBS | BODY MASS INDEX: 24.39 KG/M2 | HEIGHT: 73 IN

## 2021-07-22 DIAGNOSIS — M48.02 FORAMINAL STENOSIS OF CERVICAL REGION: Primary | ICD-10-CM

## 2021-07-22 PROCEDURE — 99214 OFFICE O/P EST MOD 30 MIN: CPT | Performed by: PHYSICIAN ASSISTANT

## 2021-07-22 NOTE — PROGRESS NOTES
New Patient: CERVICAL SPINE    Referring Provider:  Daija Lara MD    CHIEF COMPLAINT:    Chief Complaint   Patient presents with    Neck Pain     cervical, referred by ro mallory       HISTORY OF PRESENT ILLNESS:   Mr. Ab Nguyen is a pleasant 77 y.o. old male here for consultation regarding his neck and right greater than left arm pain. He states the pain began insidiously about 6 to 7 weeks ago. His pain has steadily continued since then. He rates his neck pain 4/10 and right greater than left shoulder and arm pain 4/10. He describes the pain as intermittent with constant numbness. Pain is worst with prolonged use of a computer mouse and improved some with change of position. The arm pain radiates to his hand on the right and forearm on the left. He has numbness and tingling in a C6-7 distribution. He denies weakness of his right or left arm. Patient has minimal difficulty with fine motor skills. He denies lower extremity symptoms, gait abnormality, saddle numbness and bowel or bladder dysfunction. The pain at times can interfere with his sleep. He was given a 2-week steroid taper when he was seen and evaluated by Dr. Dudley Jack which has significantly improved his right shoulder pain.   Pain Assessment  Location of Pain: Neck  Severity of Pain: 4]    Current/Past Treatment:   · Physical Therapy: None  · Chiropractic: None  · Injection: None  · Medications: None    Past Medical History:   Past Medical History:   Diagnosis Date    Adenomatous colon polyp 2009    Dyslipidemia     History of colon polyps     Status post mitral valve repair 2006        Past Surgical History:     Past Surgical History:   Procedure Laterality Date    COLONOSCOPY  12/04/09    Aden[place]    FRACTURE SURGERY Right 1983    fractured humerus, plate    MITRAL VALVE SURGERY  2006    Dr. Kaila Chase follows - Dr. Bettencourt [facility] surgeon, at [facility]       Current Medications:     Current Outpatient Medications:     atorvastatin (LIPITOR) 20 MG tablet, TAKE 1 TABLET BY MOUTH EVERY NIGHT FOR HIGH CHOLESTEROL, Disp: 90 tablet, Rfl: 1    aspirin 81 MG tablet, Take 81 mg by mouth daily. , Disp: , Rfl:     Multiple Vitamins-Minerals (THERAPEUTIC MULTIVITAMIN-MINERALS) tablet, Take 1 tablet by mouth daily. , Disp: , Rfl:     Allergies:  Patient has no known allergies. Social History:    reports that he has never smoked. He has never used smokeless tobacco. He reports current alcohol use. He reports that he does not use drugs. Family History:   Family History   Problem Relation Age of Onset    Kidney Disease Brother     Cancer Neg Hx        REVIEW OF SYSTEMS: Full ROS noted & scanned   CONSTITUTIONAL: Denies unexplained weight loss, fevers, chills or fatigue  NEUROLOGICAL: Denies unsteady gait or progressive weakness  MUSCULOSKELETAL: Denies joint swelling or redness  PSYCHOLOGICAL: Denies anxiety, depression   SKIN: Denies skin changes, delayed healing, rash, itching   HEMATOLOGIC: Denies easy bleeding or bruising  ENDOCRINE: Denies excessive thirst, urination, heat/cold  RESPIRATORY: Denies current dyspnea, cough  GI: Denies nausea, vomiting, diarrhea   : Denies bowel or bladder issues       PHYSICAL EXAM:    Vitals: Height 6' 0.99\" (1.854 m), weight 184 lb (83.5 kg). GENERAL EXAM:  · General Apparence: Patient is adequately groomed with no evidence of malnutrition. · Orientation: The patient is oriented to time, place and person. · Mood & Affect:The patient's mood and affect are appropriate   · Vascular: Examination reveals no swelling tenderness in upper or lower extremities. Good capillary refill  · Lymphatic: The lymphatic examination bilaterally reveals all areas to be without enlargement or induration  · Sensation: Sensation is decreased over the left greater than right upper extremity  · Coordination/Balance: Good coordination.  Tandem walking normal.     CERVICAL EXAMINATION:  · Inspection: Local inspection shows no step-off or bruising. Cervical alignment is normal.     · Palpation: No evidence of tenderness at the midline, and trapezius. Paraspinal tenderness is present. There is no step-off or paraspinal spasm. · Range of Motion: Cervical flexion, extension, and rotation are mildly reduced with pain. · Strength: 5/5 bilateral upper extremities   · Special Tests:    ·  Spurling's negative & Ruiz's negative bilaterally. ·  Cubital and Carpal tunnel Tinel's negative bilaterally. · Skin:There are no rashes, ulcerations or lesions in right & left upper extremities. · Reflexes: Bilaterally triceps, biceps and brachioradialis are 2+. Clonus absent bilaterally at the feet. · Gait & station: normal, patient ambulates without assistance       · Additional Examinations:       · RIGHT UPPER EXTREMITY:  Inspection/examination of the right upper extremity does not show any tenderness, deformity or injury. Range of motion is unremarkable. There is no gross instability. There are no rashes, ulcerations or lesions. Strength and tone are normal.  · LEFT UPPER EXTREMITY: Inspection/examination of the left upper extremity does not show any tenderness, deformity or injury. Range of motion is unremarkable. There is no gross instability. There are no rashes, ulcerations or lesions. Strength and tone are normal.    Diagnostic Testing:  X-rays: 2 views of the cervical spine to include AP and lateral were obtained today in the office and reviewed independently with the patient which shows flattening of the cervical lordosis with disc space narrowing at 4 5, C5-6, C6-7 with cervical spondylosis. Impression:   DDD cervical spine  Foraminal stenosis    1. Foraminal stenosis of cervical region        Plan:      · We discussed the diagnosis and treatment options including observation, physical therapy, epidural injections or additional imaging.  He wishes to proceed with MRI of the cervical spine to further evaluate the foraminal stenosis. Patient was offered gabapentin but he states that he would like to hold off at this time. Follow Up:  After the MRI to review the results and discuss treatment options    Patient examined and note dictated by Migue Godfrey PA-C.

## 2021-07-26 ENCOUNTER — TELEPHONE (OUTPATIENT)
Dept: ORTHOPEDIC SURGERY | Age: 66
End: 2021-07-26

## 2021-07-27 ENCOUNTER — TELEPHONE (OUTPATIENT)
Dept: ORTHOPEDIC SURGERY | Age: 66
End: 2021-07-27

## 2021-08-18 ENCOUNTER — OFFICE VISIT (OUTPATIENT)
Dept: ORTHOPEDIC SURGERY | Age: 66
End: 2021-08-18
Payer: COMMERCIAL

## 2021-08-18 VITALS — BODY MASS INDEX: 24.92 KG/M2 | HEIGHT: 72 IN | WEIGHT: 184 LBS

## 2021-08-18 DIAGNOSIS — M48.02 FORAMINAL STENOSIS OF CERVICAL REGION: ICD-10-CM

## 2021-08-18 DIAGNOSIS — M47.22 CERVICAL SPONDYLOSIS WITH RADICULOPATHY: Primary | ICD-10-CM

## 2021-08-18 PROCEDURE — 99214 OFFICE O/P EST MOD 30 MIN: CPT | Performed by: ORTHOPAEDIC SURGERY

## 2021-08-18 NOTE — PROGRESS NOTES
New Patient: CERVICAL SPINE    Referring Provider:  No ref. provider found    CHIEF COMPLAINT:    Chief Complaint   Patient presents with    Back Pain     TR MRI lumbar       HISTORY OF PRESENT ILLNESS:   Mr. Oleg Garcia is a pleasant 77 y.o. old male here for consultation regarding his neck and right greater than left arm pain. He states the pain began insidiously about 10 weeks ago. His pain has steadily continued since then. He rates his neck pain 4/10 and right greater than left shoulder and arm pain 4/10. He describes the pain as intermittent with constant numbness. Pain is worst with prolonged use of a computer mouse and improved some with change of position. The arm pain radiates to his hand on the right and forearm on the left. He has numbness and tingling in a C6-7 distribution. He denies weakness of his right or left arm. Patient has minimal difficulty with fine motor skills. He denies lower extremity symptoms, gait abnormality, saddle numbness and bowel or bladder dysfunction. The pain at times can interfere with his sleep. He was given a 2-week steroid taper when he was seen and evaluated by Dr. Tamara Bingham which has significantly improved his right shoulder pain.       Pain Assessment  Location of Pain: Back  Location Modifiers: Right  Severity of Pain: 4  Quality of Pain: Dull, Aching  Duration of Pain: Persistent  Frequency of Pain: Constant  Aggravating Factors: Walking, Standing  Limiting Behavior: Yes  Relieving Factors: Rest  Result of Injury: No  Work-Related Injury: No  Are there other pain locations you wish to document?: No]    Current/Past Treatment:   · Physical Therapy: None  · Chiropractic: None  · Injection: None  · Medications: None    Past Medical History:   Past Medical History:   Diagnosis Date    Adenomatous colon polyp 2009    Dyslipidemia     History of colon polyps     Status post mitral valve repair 2006        Past Surgical History:     Past Surgical History:   Procedure Laterality Date    COLONOSCOPY  12/04/09    Adenoma    FRACTURE SURGERY Right 1983    fractured humerus, plate    MITRAL VALVE SURGERY  2006    Dr. Yonathan Sotelo follows - Dr. Laura Quigley surgeon, at [facility]       Current Medications:     Current Outpatient Medications:     atorvastatin (LIPITOR) 20 MG tablet, TAKE 1 TABLET BY MOUTH EVERY NIGHT FOR HIGH CHOLESTEROL, Disp: 90 tablet, Rfl: 1    aspirin 81 MG tablet, Take 81 mg by mouth daily. , Disp: , Rfl:     Multiple Vitamins-Minerals (THERAPEUTIC MULTIVITAMIN-MINERALS) tablet, Take 1 tablet by mouth daily. , Disp: , Rfl:     Allergies:  Patient has no known allergies. Social History:    reports that he has never smoked. He has never used smokeless tobacco. He reports current alcohol use. He reports that he does not use drugs. Family History:   Family History   Problem Relation Age of Onset    Kidney Disease Brother     Cancer Neg Hx        REVIEW OF SYSTEMS: Full ROS noted & scanned   CONSTITUTIONAL: Denies unexplained weight loss, fevers, chills or fatigue  NEUROLOGICAL: Denies unsteady gait or progressive weakness  MUSCULOSKELETAL: Denies joint swelling or redness  PSYCHOLOGICAL: Denies anxiety, depression   SKIN: Denies skin changes, delayed healing, rash, itching   HEMATOLOGIC: Denies easy bleeding or bruising  ENDOCRINE: Denies excessive thirst, urination, heat/cold  RESPIRATORY: Denies current dyspnea, cough  GI: Denies nausea, vomiting, diarrhea   : Denies bowel or bladder issues       PHYSICAL EXAM:    Vitals: Height 6' (1.829 m), weight 184 lb (83.5 kg). GENERAL EXAM:  · General Apparence: Patient is adequately groomed with no evidence of malnutrition. · Orientation: The patient is oriented to time, place and person. · Mood & Affect:The patient's mood and affect are appropriate   · Vascular: Examination reveals no swelling tenderness in upper or lower extremities.  Good capillary refill  · Lymphatic: The lymphatic examination bilaterally reveals all areas to be without enlargement or induration  · Sensation: Sensation is decreased over the left greater than right upper extremity  · Coordination/Balance: Good coordination. Tandem walking normal.     CERVICAL EXAMINATION:  · Inspection: Local inspection shows no step-off or bruising. Cervical alignment is normal.     · Palpation: No evidence of tenderness at the midline, and trapezius. Paraspinal tenderness is present. There is no step-off or paraspinal spasm. · Range of Motion: Cervical flexion, extension, and rotation are mildly reduced with pain. · Strength: 5/5 bilateral upper extremities   · Special Tests:    ·  Spurling's negative & Ruiz's negative bilaterally. ·  Cubital and Carpal tunnel Tinel's negative bilaterally. · Skin:There are no rashes, ulcerations or lesions in right & left upper extremities. · Reflexes: Bilaterally triceps, biceps and brachioradialis are 2+. Clonus absent bilaterally at the feet. · Gait & station: normal, patient ambulates without assistance       · Additional Examinations:       · RIGHT UPPER EXTREMITY:  Inspection/examination of the right upper extremity does not show any tenderness, deformity or injury. Range of motion is unremarkable. There is no gross instability. There are no rashes, ulcerations or lesions. Strength and tone are normal.  · LEFT UPPER EXTREMITY: Inspection/examination of the left upper extremity does not show any tenderness, deformity or injury. Range of motion is unremarkable. There is no gross instability. There are no rashes, ulcerations or lesions. Strength and tone are normal.    Diagnostic Testing:  X-rays: 2 views of the cervical spine to include AP and lateral were obtained during a previous office visit show flattening of the cervical lordosis with disc space narrowing at C4 5, C5-6, C6-7 with cervical spondylosis. Reviewed MRI images of his cervical spine in the office today.   Those show moderate to severe central stenosis C4-C5 and multilevel left greater than right foraminal stenosis    Impression:   Cervical spondylosis with radiculopathy      Plan:      · We discussed the diagnosis and treatment options including observation, physical therapy, epidural injections or EMG. I recommended home cervical traction, exercise program and EMG of his left upper extremity. He will start physical therapy if his symptoms persist after those. Cautioned him to avoid any activities that could cause vigorous flexion extension of his cervical spine. He understands those activities could result in paralysis.

## 2021-08-20 DIAGNOSIS — M48.02 FORAMINAL STENOSIS OF CERVICAL REGION: Primary | ICD-10-CM

## 2021-10-13 ENCOUNTER — HOSPITAL ENCOUNTER (OUTPATIENT)
Dept: NEUROLOGY | Age: 66
Discharge: HOME | End: 2021-10-13
Payer: COMMERCIAL

## 2021-10-13 DIAGNOSIS — M48.02 FORAMINAL STENOSIS OF CERVICAL REGION: ICD-10-CM

## 2021-10-13 PROCEDURE — 95886 MUSC TEST DONE W/N TEST COMP: CPT

## 2021-10-13 PROCEDURE — 95910 NRV CNDJ TEST 7-8 STUDIES: CPT

## 2021-10-13 NOTE — PROCEDURES
Test Date:  10/13/2021    Patient: Leah Cervantes : 1955 Physician: Elma Lino DO   Sex: Male ID#:  Ref Phys: Chino Bowman MD     Patient Complaints:  Patient is a 77year-old male who presents with neck pain in the upper extremities right more severe than left. Onset     Patient History / Exam:  PMH: no endocrine disease, + right ORIF humerus 40+ yrs ago. PE: reflexes trace normal strength    NCV & EMG Findings:  Evaluation of the left median (APB) motor nerve showed reduced amplitude (2.2 mV). The right median (APB) motor nerve showed prolonged distal onset latency (4.6 ms) and reduced amplitude (3.0 mV). The right median sensory nerve showed prolonged distal peak latency (4.5 ms), reduced amplitude (7 µV), and decreased conduction velocity (31 m/s). All remaining nerves (as indicated in the following tables) were within normal limits. All examined muscles (as indicated in the following table) showed no evidence of electrical instability. Impression:  study is consistent with right carpal tunnel syndrome, moderate severity. No evidence of an acute radiculopathy or other entrapment neuropathy at this time. Thank you.        Elma Lino DO        Nerve Conduction Studies  Motor Nerve Results      Latency Amplitude F-Lat Segment Distance CV Comment   Site (ms) Norm (mV) Norm (ms)  (cm) (m/s) Norm    Left Median (APB) Motor   Wrist 3.8  < 4.2 2.2  > 5.0         Elbow 8.8 - 2.1 -  Elbow-Wrist 26 52  > 50    Right Median (APB) Motor   Wrist 4.6  < 4.2 3.0  > 5.0         Elbow 9.2 - 2.5 -  Elbow-Wrist 26 57  > 50    Left Ulnar (ADM) Motor   Wrist 3.8  < 4.2 3.8  > 3.0         Bel Elbow 8.4 - 3.7 -  Bel Elbow-Wrist 25 54  > 50    Abv Elbow 10.0 - 3.6 -  Abv Elbow-Bel Elbow 8 50  > 48    Right Ulnar (ADM) Motor   Wrist 2.6  < 4.2 5.7  > 3.0         Bel Elbow 8.3 - 5.1 -  Bel Elbow-Wrist 29 51  > 50    Abv Elbow 9.3 - 4.9 -  Abv Elbow-Bel Elbow 6 60  > 48      Sensory Nerve Results Latency (Peak) Amplitude (P-P) Segment Distance CV Comment   Site (ms) Norm (µV) Norm  (cm) (m/s) Norm    Left Median Sensory   Wrist-Dig II 3.5  < 3.6 22  > 10 Wrist-Dig II 14 40  > 39    Right Median Sensory   Wrist-Dig II 4.5  < 3.6 7  > 10 Wrist-Dig II 14 31  > 39    Left Ulnar Sensory   Wrist-Dig V 3.5  < 3.7 23  > 15 Wrist-Dig V 14 40  > 38    Right Ulnar Sensory   Wrist-Dig V 3.0  < 3.7 31  > 15 Wrist-Dig V 14 47  > 38        Electromyography     Side Muscle Nerve Root Ins Act Fibs Psw Amp Dur Poly Recrt Int Emmanuel Salty Comment   Right Deltoid Axillary C5-C6 Nml Nml Nml Nml Nml 0 Nml Nml    Right Biceps Musculocut C5-C6 Nml Nml Nml Nml Nml 0 Nml Nml    Right Triceps Radial C6-C8 Nml Nml Nml Nml Nml 0 Nml Nml    Right Brachiorad Radial C5-C6 Nml Nml Nml Nml Nml 0 Nml Nml    Right Pronator Teres Median C6-C7 Nml Nml Nml Nml Nml 0 Nml Nml    Right EIP Post Interosseous,  R... C7-C8 Nml Nml Nml Nml Nml 0 Nml Nml    Right APB Median C8-T1 Nml Nml Nml Nml Nml 0 Nml Nml    Right FDI Ulnar C8-T1 Nml Nml Nml Nml Nml 0 Nml Nml    Right Cervical Paraspinal (Uppe. .. Rami C1-C3 Nml Nml Nml         Right Cervical Paraspinal (Mid) Rami C4-C6 Nml Nml Nml         Right Cervical Paraspinal ([place]chet. .. Rami C7-C8 Nml Nml Nml         Left Deltoid Axillary C5-C6 Nml Nml Nml Nml Nml 0 Nml Nml    Left Biceps Musculocut C5-C6 Nml Nml Nml Nml Nml 0 Nml Nml    Left Triceps Radial C6-C8 Nml Nml Nml Nml Nml 0 Nml Nml    Left Brachiorad Radial C5-C6 Nml Nml Nml Nml Nml 0 Nml Nml    Left Pronator Teres Median C6-C7 Nml Nml Nml Nml Nml 0 Nml Nml    Left EIP Post Interosseous,  R... C7-C8 Nml Nml Nml Nml Nml 0 Nml Nml    Left APB Median C8-T1 Nml Nml Nml Nml Nml 0 Nml Nml    Left FDI Ulnar C8-T1 Nml Nml Nml Nml Nml 0 Nml Nml    Left Cervical Paraspinal (Uppe. .. Rami C1-C3 Nml Nml Nml         Left Cervical Paraspinal (Mid) Rami C4-C6 Nml Nml Nml         Left Cervical Paraspinal ([place]. ..  Rami C7-C8 Nml Nml Nml             Electronically signed by Elma Lino DO on 10/13/2021 at 8:27 AM

## 2021-10-21 ENCOUNTER — TELEPHONE (OUTPATIENT)
Dept: ORTHOPEDIC SURGERY | Age: 66
End: 2021-10-21

## 2021-10-21 NOTE — TELEPHONE ENCOUNTER
----- Message from Comfort Becerra MD sent at 10/13/2021  9:53 AM EDT -----  EMG shows carpal tunnel syndrome in addition to compressed spinal cord and nerves in the neck  They went to see Dr. Pawel Gomez then follow-up with me

## 2021-11-11 ENCOUNTER — OFFICE VISIT (OUTPATIENT)
Dept: ORTHOPEDIC SURGERY | Age: 66
End: 2021-11-11
Payer: COMMERCIAL

## 2021-11-11 VITALS — BODY MASS INDEX: 24.92 KG/M2 | WEIGHT: 184 LBS | HEIGHT: 72 IN

## 2021-11-11 DIAGNOSIS — M47.22 CERVICAL SPONDYLOSIS WITH RADICULOPATHY: Primary | ICD-10-CM

## 2021-11-11 DIAGNOSIS — G56.01 CARPAL TUNNEL SYNDROME OF RIGHT WRIST: Primary | ICD-10-CM

## 2021-11-11 PROCEDURE — 99214 OFFICE O/P EST MOD 30 MIN: CPT | Performed by: ORTHOPAEDIC SURGERY

## 2021-11-11 NOTE — PROGRESS NOTES
New Patient: CERVICAL SPINE    Referring Provider:  No ref. provider found    CHIEF COMPLAINT:    Chief Complaint   Patient presents with    Neck Pain     cervical       HISTORY OF PRESENT ILLNESS:   Mr. Debbie Cowart is a pleasant 77 y.o. old male here for consultation regarding his neck and right greater than left arm pain. He states the pain began insidiously about 5 months ago. His pain has steadily continued since then. He rates his neck pain 4/10 and right greater than left shoulder and arm pain 4/10. He describes the pain as intermittent with constant numbness. Pain is worst with prolonged use of a computer mouse and improved some with change of position. The arm pain radiates to his hand on the right and forearm on the left. He has numbness and tingling in a C6-7 distribution. He denies weakness of his right or left arm. Patient has minimal difficulty with fine motor skills. He denies lower extremity symptoms, gait abnormality, saddle numbness and bowel or bladder dysfunction. The pain at times can interfere with his sleep. He was given a 2-week steroid taper when he was seen and evaluated by Dr. Kira Perera which has significantly improved his right shoulder pain.       Pain Assessment  Location of Pain: Neck  Severity of Pain: 3  Quality of Pain: Other (Comment)  Duration of Pain: Other (Comment)]    Current/Past Treatment:   · Physical Therapy: Has tried  · Chiropractic: None  · Injection: None  · Medications: None    Past Medical History:   Past Medical History:   Diagnosis Date    Adenomatous colon polyp 2009    Dyslipidemia     History of colon polyps     Status post mitral valve repair 2006        Past Surgical History:     Past Surgical History:   Procedure Laterality Date    COLONOSCOPY  12/04/09    Adenoma    FRACTURE SURGERY Right 1983    fractured humerus, plate    MITRAL VALVE SURGERY  2006    Dr. Caitlin merrill - Dr. Laird Sutton surgeon, at [facility]       Current Medications:     Current Outpatient Medications:     atorvastatin (LIPITOR) 20 MG tablet, TAKE 1 TABLET BY MOUTH EVERY NIGHT FOR HIGH CHOLESTEROL, Disp: 90 tablet, Rfl: 1    aspirin 81 MG tablet, Take 81 mg by mouth daily. , Disp: , Rfl:     Multiple Vitamins-Minerals (THERAPEUTIC MULTIVITAMIN-MINERALS) tablet, Take 1 tablet by mouth daily. , Disp: , Rfl:     Allergies:  Patient has no known allergies. Social History:    reports that he has never smoked. He has never used smokeless tobacco. He reports current alcohol use. He reports that he does not use drugs. Family History:   Family History   Problem Relation Age of Onset    Kidney Disease Brother     Cancer Neg Hx        REVIEW OF SYSTEMS: Full ROS noted & scanned   CONSTITUTIONAL: Denies unexplained weight loss, fevers, chills or fatigue  NEUROLOGICAL: Denies unsteady gait or progressive weakness  MUSCULOSKELETAL: Denies joint swelling or redness  PSYCHOLOGICAL: Denies anxiety, depression   SKIN: Denies skin changes, delayed healing, rash, itching   HEMATOLOGIC: Denies easy bleeding or bruising  ENDOCRINE: Denies excessive thirst, urination, heat/cold  RESPIRATORY: Denies current dyspnea, cough  GI: Denies nausea, vomiting, diarrhea   : Denies bowel or bladder issues       PHYSICAL EXAM:    Vitals: Height 6' 0.01\" (1.829 m), weight 184 lb (83.5 kg). GENERAL EXAM:  · General Apparence: Patient is adequately groomed with no evidence of malnutrition. · Orientation: The patient is oriented to time, place and person. · Mood & Affect:The patient's mood and affect are appropriate   · Vascular: Examination reveals no swelling tenderness in upper or lower extremities. Good capillary refill  · Lymphatic: The lymphatic examination bilaterally reveals all areas to be without enlargement or induration  · Sensation: Sensation is decreased over the left greater than right upper extremity  · Coordination/Balance: Good coordination.  Tandem walking normal.     CERVICAL EXAMINATION:  · Inspection: Local inspection shows no step-off or bruising. Cervical alignment is normal.     · Palpation: No evidence of tenderness at the midline, and trapezius. Paraspinal tenderness is present. There is no step-off or paraspinal spasm. · Range of Motion: Cervical flexion, extension, and rotation are mildly reduced with pain. · Strength: 5/5 bilateral upper extremities   · Special Tests:    ·  Spurling's negative & Ruiz's negative bilaterally. ·  Cubital and Carpal tunnel Tinel's negative bilaterally. · Skin:There are no rashes, ulcerations or lesions in right & left upper extremities. · Reflexes: Bilaterally triceps, biceps and brachioradialis are 2+. Clonus absent bilaterally at the feet. · Gait & station: normal, patient ambulates without assistance       · Additional Examinations:       · RIGHT UPPER EXTREMITY:  Inspection/examination of the right upper extremity does not show any tenderness, deformity or injury. Range of motion is unremarkable. There is no gross instability. There are no rashes, ulcerations or lesions. Strength and tone are normal.  · LEFT UPPER EXTREMITY: Inspection/examination of the left upper extremity does not show any tenderness, deformity or injury. Range of motion is unremarkable. There is no gross instability. There are no rashes, ulcerations or lesions. Strength and tone are normal.    Diagnostic Testing:  X-rays: 2 views of the cervical spine to include AP and lateral were obtained during a previous office visit show flattening of the cervical lordosis with disc space narrowing at C4 5, C5-6, C6-7 with cervical spondylosis. I reviewed MRI images of his cervical spine from 8/4/2021 in the office today. Those show moderate to severe central stenosis C4-C5 and multilevel left greater than right foraminal stenosis    I reviewed an EMG of his right upper extremity from 10/13/2021 in the office today.   That shows moderate right carpal tunnel syndrome without acute radiculopathy    Impression:   Cervical spondylosis with radiculopathy      Plan:      · We discussed the diagnosis and treatment options including observation, physical therapy, epidural injections or referral to Dr. Aj Lucero. I recommend he see Dr. Aj Lucero. He will return to see me if his symptoms persist after seeing Dr. Aj Lucero. If his symptoms are much improved with treatment of his carpal tunnel syndrome he will return and see me and 6 months for repeat cervical examination. I once again cautioned him to avoid any activities that could cause vigorous flexion or extension of his cervical spine.

## 2021-11-22 ENCOUNTER — OFFICE VISIT (OUTPATIENT)
Dept: FAMILY MEDICINE CLINIC | Age: 66
End: 2021-11-22
Payer: COMMERCIAL

## 2021-11-22 VITALS
DIASTOLIC BLOOD PRESSURE: 78 MMHG | HEIGHT: 72 IN | BODY MASS INDEX: 25.06 KG/M2 | SYSTOLIC BLOOD PRESSURE: 120 MMHG | TEMPERATURE: 96.8 F | RESPIRATION RATE: 16 BRPM | WEIGHT: 185 LBS | HEART RATE: 67 BPM | OXYGEN SATURATION: 98 %

## 2021-11-22 DIAGNOSIS — E78.2 MIXED HYPERLIPIDEMIA: ICD-10-CM

## 2021-11-22 DIAGNOSIS — Z00.00 ANNUAL PHYSICAL EXAM: Primary | ICD-10-CM

## 2021-11-22 PROCEDURE — 99397 PER PM REEVAL EST PAT 65+ YR: CPT | Performed by: FAMILY MEDICINE

## 2021-11-22 PROCEDURE — 90694 VACC AIIV4 NO PRSRV 0.5ML IM: CPT | Performed by: FAMILY MEDICINE

## 2021-11-22 PROCEDURE — 36415 COLL VENOUS BLD VENIPUNCTURE: CPT | Performed by: FAMILY MEDICINE

## 2021-11-22 PROCEDURE — 90471 IMMUNIZATION ADMIN: CPT | Performed by: FAMILY MEDICINE

## 2021-11-22 RX ORDER — ATORVASTATIN CALCIUM 20 MG/1
TABLET, FILM COATED ORAL
Qty: 90 TABLET | Refills: 3 | Status: SHIPPED | OUTPATIENT
Start: 2021-11-22

## 2021-11-22 SDOH — ECONOMIC STABILITY: FOOD INSECURITY: WITHIN THE PAST 12 MONTHS, THE FOOD YOU BOUGHT JUST DIDN'T LAST AND YOU DIDN'T HAVE MONEY TO GET MORE.: NEVER TRUE

## 2021-11-22 SDOH — ECONOMIC STABILITY: FOOD INSECURITY: WITHIN THE PAST 12 MONTHS, YOU WORRIED THAT YOUR FOOD WOULD RUN OUT BEFORE YOU GOT MONEY TO BUY MORE.: NEVER TRUE

## 2021-11-22 ASSESSMENT — ENCOUNTER SYMPTOMS
SINUS PRESSURE: 0
EYE DISCHARGE: 0
CHEST TIGHTNESS: 0
COUGH: 0
APNEA: 0
ABDOMINAL PAIN: 0
BLOOD IN STOOL: 0
SHORTNESS OF BREATH: 0
EYE PAIN: 0
SINUS PAIN: 0
COLOR CHANGE: 0
RHINORRHEA: 0
NAUSEA: 0
SORE THROAT: 0
ABDOMINAL DISTENTION: 0
DIARRHEA: 0
WHEEZING: 0
VOMITING: 0
EYE REDNESS: 0
CONSTIPATION: 0

## 2021-11-22 ASSESSMENT — PATIENT HEALTH QUESTIONNAIRE - PHQ9
SUM OF ALL RESPONSES TO PHQ9 QUESTIONS 1 & 2: 0
SUM OF ALL RESPONSES TO PHQ QUESTIONS 1-9: 0
1. LITTLE INTEREST OR PLEASURE IN DOING THINGS: 0
SUM OF ALL RESPONSES TO PHQ QUESTIONS 1-9: 0
SUM OF ALL RESPONSES TO PHQ QUESTIONS 1-9: 0
2. FEELING DOWN, DEPRESSED OR HOPELESS: 0

## 2021-11-22 ASSESSMENT — SOCIAL DETERMINANTS OF HEALTH (SDOH): HOW HARD IS IT FOR YOU TO PAY FOR THE VERY BASICS LIKE FOOD, HOUSING, MEDICAL CARE, AND HEATING?: NOT HARD AT ALL

## 2021-11-22 NOTE — PROGRESS NOTES
Subjective:      Patient ID: Margarita Cheung is a 77 y.o. male. HPI  Chief Complaint   Patient presents with    Annual Exam     Pt is here for physical and is fasting     Ester Patton is a 72year old male presenting to clinic for a wellness visit and blood work. He has no acute complaints today. He has a history of hypercholesterolemia which is well controlled on lipitor. He states his last colonoscopy was five years ago and that he is going to call and schedule another one soon. He has no vision issues and does not see an eye doctor. He is up to date on his dental exam. He is fairly active and goes on daily walks. He also maintains a healthy diet and restricts his sodium intake. He saw cardiology in January with no abnormal findings. No new chest pain or shortness of breath. Margarita Cheung is a 77 y.o. male with the following history as recorded in [facility]:  Patient Active Problem List    Diagnosis Date Noted    Enlarged prostate 11/18/2020    Elevated PSA 11/18/2020    Mixed hyperlipidemia 01/16/2017    S/P MVR (mitral valve repair) 06/30/2014     Current Outpatient Medications   Medication Sig Dispense Refill    atorvastatin (LIPITOR) 20 MG tablet TAKE 1 TABLET BY MOUTH EVERY NIGHT FOR HIGH CHOLESTEROL 90 tablet 3    aspirin 81 MG tablet Take 81 mg by mouth daily.  Multiple Vitamins-Minerals (THERAPEUTIC MULTIVITAMIN-MINERALS) tablet Take 1 tablet by mouth daily. No current facility-administered medications for this visit. Allergies: Patient has no known allergies.   Past Medical History:   Diagnosis Date    Adenomatous colon polyp 2009    Dyslipidemia     History of colon polyps     Status post mitral valve repair 2006     Past Surgical History:   Procedure Laterality Date    COLONOSCOPY  12/04/09    Adenoma    FRACTURE SURGERY Right 1983    fractured humerus, plate    MITRAL VALVE SURGERY  2006    Dr. Reginaldo Garcia follows - Dr. Beatris Patel surgeon, at [facility] History   Problem Relation Age of Onset    Kidney Disease Brother     Cancer Neg Hx      Social History     Tobacco Use    Smoking status: Never Smoker    Smokeless tobacco: Never Used   Substance Use Topics    Alcohol use: Yes     Alcohol/week: 0.0 standard drinks     Comment: rarely - 2-4/month     Vitals:    11/22/21 1546   BP: 120/78   Site: Right Upper Arm   Position: Sitting   Pulse: 67   Resp: 16   Temp: 96.8 °F (36 °C)   TempSrc: Temporal   SpO2: 98%   Weight: 185 lb (83.9 kg)   Height: 6' (1.829 m)     Body mass index is 25.09 kg/m². Wt Readings from Last 3 Encounters:   11/22/21 185 lb (83.9 kg)   11/11/21 184 lb (83.5 kg)   08/18/21 184 lb (83.5 kg)     BP Readings from Last 3 Encounters:   11/22/21 120/78   11/18/20 128/72   10/01/20 120/74        Review of Systems   Constitutional: Negative for activity change, chills, diaphoresis, fatigue, fever and unexpected weight change. HENT: Negative for congestion, dental problem, ear discharge, ear pain, hearing loss, rhinorrhea, sinus pressure, sinus pain, sore throat and tinnitus. Eyes: Negative for pain, discharge, redness and visual disturbance. Respiratory: Negative for apnea, cough, chest tightness, shortness of breath and wheezing. Cardiovascular: Negative for chest pain, palpitations and leg swelling. Gastrointestinal: Negative for abdominal distention, abdominal pain, blood in stool, constipation, diarrhea, nausea and vomiting. Genitourinary: Negative for difficulty urinating, dysuria, frequency, hematuria and urgency. Musculoskeletal: Negative for gait problem, joint swelling and myalgias. Skin: Negative for color change and wound. Allergic/Immunologic: Negative for immunocompromised state. Neurological: Negative for dizziness, tremors, seizures, syncope, weakness, light-headedness, numbness and headaches. Hematological: Negative for adenopathy.    Psychiatric/Behavioral: Negative for agitation, confusion, dysphoric mood, hallucinations, self-injury and sleep disturbance. The patient is not nervous/anxious. Objective:   Physical Exam  Constitutional:       General: He is not in acute distress. Appearance: Normal appearance. He is not ill-appearing. HENT:      Head: Normocephalic and atraumatic. Right Ear: Tympanic membrane and ear canal normal. There is no impacted cerumen. Left Ear: Tympanic membrane and ear canal normal. There is no impacted cerumen. Nose: Nose normal.      Mouth/Throat:      Mouth: Mucous membranes are moist.      Pharynx: No posterior oropharyngeal erythema. Eyes:      General:         Right eye: No discharge. Left eye: No discharge. Extraocular Movements: Extraocular movements intact. Pupils: Pupils are equal, round, and reactive to light. Neck:      Vascular: No carotid bruit. Cardiovascular:      Rate and Rhythm: Normal rate and regular rhythm. Pulses: Normal pulses. Heart sounds: Normal heart sounds. No murmur heard. No gallop. Pulmonary:      Effort: Pulmonary effort is normal. No respiratory distress. Breath sounds: Normal breath sounds. No wheezing or rales. Abdominal:      General: Bowel sounds are normal. There is no distension. Palpations: Abdomen is soft. Tenderness: There is no abdominal tenderness. There is no guarding or rebound. Hernia: No hernia is present. Musculoskeletal:         General: No swelling, tenderness or deformity. Cervical back: Normal range of motion and neck supple. No rigidity or tenderness. Right lower leg: No edema. Left lower leg: No edema. Lymphadenopathy:      Cervical: No cervical adenopathy. Skin:     General: Skin is warm. Coloration: Skin is not jaundiced or pale. Findings: No bruising or erythema. Neurological:      General: No focal deficit present. Mental Status: He is alert and oriented to person, place, and time. Sensory: No sensory deficit.       Motor: No weakness. Coordination: Coordination normal.      Gait: Gait normal.   Psychiatric:         Mood and Affect: Mood normal.         Behavior: Behavior normal.         Assessment:      cpe      Plan:      Patient Counseling:  --Nutrition: Stressed importance of moderation in sodium/caffeine intake, saturated fat and cholesterol, caloric balance, sufficient intake of fresh fruits, vegetables, fiber, calcium, iron, and 1 mg of folate supplement per day (for females capable of pregnancy). --Exercise: Stressed the importance of regular exercise. --Dental health: Discussed importance of regular tooth brushing, flossing, and dental visits. --Immunizations reviewed. Daily sunscreen recommended. Yearly FSE discussed  --Discussed benefits of screening colonoscopy.     Orders Placed This Encounter   Procedures    INFLUENZA, QUADV, ADJUVANTED, 72 YRS =, IM, PF, PREFILL SYR, 0.5ML (FLUAD)    LIPID PANEL     Order Specific Question:   Is Patient Fasting?/# of Hours     Answer:   15    COMPREHENSIVE METABOLIC PANEL    PSA screening     Orders Placed This Encounter   Medications    atorvastatin (LIPITOR) 20 MG tablet     Sig: TAKE 1 TABLET BY MOUTH EVERY NIGHT FOR HIGH CHOLESTEROL     Dispense:  90 tablet     Refill:  3             MUNA Richardson [address] RACH, DO

## 2021-11-22 NOTE — PATIENT INSTRUCTIONS

## 2021-11-22 NOTE — PROGRESS NOTES
Vaccine Information Sheet, \"Influenza - Inactivated\"  given to Alan Mcmullen, or parent/legal guardian of  Alan Mcmullen and verbalized understanding. Patient responses:    Have you ever had a reaction to a flu vaccine? No  Do you have any current illness? No  Have you ever had Guillian Linden Syndrome? No  Do you have a serious allergy to any of the follow: Neomycin, Polymyxin, Thimerosal, eggs or egg products? No    Flu vaccine given per order. Please see immunization tab. Risks and benefits explained. Current VIS given.

## 2021-11-23 LAB
A/G RATIO: 2 (ref 1.1–2.2)
ALBUMIN SERPL-MCNC: 4.5 G/DL (ref 3.4–5)
ALP BLD-CCNC: 77 U/L (ref 40–129)
ALT SERPL-CCNC: 27 U/L (ref 10–40)
ANION GAP SERPL CALCULATED.3IONS-SCNC: 16 MMOL/L (ref 3–16)
AST SERPL-CCNC: 20 U/L (ref 15–37)
BILIRUB SERPL-MCNC: 0.7 MG/DL (ref 0–1)
BUN BLDV-MCNC: 20 MG/DL (ref 7–20)
CALCIUM SERPL-MCNC: 9.6 MG/DL (ref 8.3–10.6)
CHLORIDE BLD-SCNC: 105 MMOL/L (ref 99–110)
CHOLESTEROL, TOTAL: 152 MG/DL (ref 0–199)
CO2: 23 MMOL/L (ref 21–32)
CREAT SERPL-MCNC: 0.8 MG/DL (ref 0.8–1.3)
GFR AFRICAN AMERICAN: >60
GFR NON-AFRICAN AMERICAN: >60
GLUCOSE BLD-MCNC: 81 MG/DL (ref 70–99)
HDLC SERPL-MCNC: 60 MG/DL (ref 40–60)
LDL CHOLESTEROL CALCULATED: 80 MG/DL
POTASSIUM SERPL-SCNC: 4.6 MMOL/L (ref 3.5–5.1)
PROSTATE SPECIFIC ANTIGEN: 5.72 NG/ML (ref 0–4)
SODIUM BLD-SCNC: 144 MMOL/L (ref 136–145)
TOTAL PROTEIN: 6.8 G/DL (ref 6.4–8.2)
TRIGL SERPL-MCNC: 62 MG/DL (ref 0–150)
VLDLC SERPL CALC-MCNC: 12 MG/DL

## 2021-11-24 NOTE — RESULT ENCOUNTER NOTE
Patient was advised of results and voiced understanding.   He is scheduled with  in December, labs have been faxed to urologist.

## 2021-12-08 LAB
P2PSA: 16 PG/ML
PHI-HYB: 34
PROSTATE SPECIFIC ANTIGEN FREE: 0.96 NG/ML
PROSTATE SPECIFIC ANTIGEN PERCENT FREE: 23.1 %
PSA, TOTAL: 4.15 NG/ML (ref 0–4)

## 2021-12-09 ENCOUNTER — NURSE ONLY (OUTPATIENT)
Dept: FAMILY MEDICINE CLINIC | Age: 66
End: 2021-12-09
Payer: COMMERCIAL

## 2021-12-09 DIAGNOSIS — Z23 NEED FOR SHINGLES VACCINE: Primary | ICD-10-CM

## 2021-12-09 PROCEDURE — 90750 HZV VACC RECOMBINANT IM: CPT | Performed by: FAMILY MEDICINE

## 2021-12-09 PROCEDURE — 90471 IMMUNIZATION ADMIN: CPT | Performed by: FAMILY MEDICINE

## 2021-12-15 ENCOUNTER — TELEPHONE (OUTPATIENT)
Dept: ORTHOPEDIC SURGERY | Age: 66
End: 2021-12-15

## 2021-12-29 ENCOUNTER — OFFICE VISIT (OUTPATIENT)
Dept: ORTHOPEDIC SURGERY | Age: 66
End: 2021-12-29
Payer: COMMERCIAL

## 2021-12-29 VITALS — RESPIRATION RATE: 16 BRPM | WEIGHT: 185 LBS | BODY MASS INDEX: 25.06 KG/M2 | HEIGHT: 72 IN

## 2021-12-29 DIAGNOSIS — G56.01 CARPAL TUNNEL SYNDROME OF RIGHT WRIST: Primary | ICD-10-CM

## 2021-12-29 PROCEDURE — 99204 OFFICE O/P NEW MOD 45 MIN: CPT | Performed by: PHYSICIAN ASSISTANT

## 2021-12-29 NOTE — LETTER
CONSENT TO OPERATION  AND/OR OTHER PROCEDURE(S)          PATIENT : Severo Das OF BIRTH:  1955      DATE : 12/29/21          1. I request and consent that Dr. Adriana Jimenez,  and/or his associates or assistants perform an operation and/or procedures on the above patient at  [place] [address], to treat the condition(s) which appear indicated by the diagnostic studies already performed. I have been told that in general terms the nature, purpose and reasonable expectations of the operation and/or procedure(s) are:     Right Carpal Tunnel Release       2. It has been explained to me by the informing physician that during the course of the operation and/or procedure(s) unforeseen conditions may be revealed that necessitate an extension of the original operation and/or procedure(s) or different operation and/or procedures than those set forth in Paragraph 1. I therefore authorize and request that my physician and/or his associates or assistants perform such operations and/or procedures as are necessary and desirable in the exercise of professional judgment. The authority granted under this Paragraph 2 shall extend to all conditions that require treatment and are known to my physician at the time the operation is commenced. 3. I have been made aware by the informing physician of certain risks and consequences that are associated with the operation and/or procedure(s) described in Paragraph 1, the reasonable alternative methods or treatment, the possible consequences, the possibility that the operation and/or procedure(s) may be unsuccessful and the possibility of complications.   I understand the reasonably known risks to be:      - Bleeding  - Infection  - Poor Healing  - Possible Damage to Nerve, Vessel, Tendon/Muscle or Bone  - Need for further Treatment/Surgery  - Stiffness  - Pain  - Residual or Recurrent Symptoms  - Anesthetic and/or Medical Risks  - We have discussed the specific limitations and risks of hospital and/or office based treatment at this time due to the COVID-19 pandemic                I have been counseled about the risks of shane Covid-19 in the roberto-operative and post-operative periods related to this procedure. I have been made aware that shane Covid-19 around the time of a surgical procedure may worsen my prognosis for recovering from the virus and lend to a higher morbidity and or mortality risk. With this knowledge, I have requested to proceed with the procedure as scheduled. 4. I have also been informed by the informing physician that there are other risks from both known and unknown causes that are attendant to the performance of any surgical procedure. I am aware that the practice of medicine and surgery is not an exact science, and that no guarantees have been made to me concerning the results of the operation and/or procedure(s). 5. I   CONSENT / REFUSE CONSENT  (strike the phrase that does not apply) to the taking of photographs before, during and/or after the operation or procedure for scientific/educational purposes. 6. I consent to the administration of anesthesia and to the use of such anesthetics as may be deemed advisable by the anesthesiologist who has been engaged by me or my physician.     7. I certify that I have read and understand the above consent to operation and/or other procedure(s); that the explanations therein referred to were made to me by the informing physician in advance of my signing this consent; that all blanks or statements requiring insertion or completion were filled in and inapplicable paragraphs, if any, were stricken before I signed; and that all questions asked by me about the operation and/or procedure(s) which I have consented to have been fully answered in a satisfactory manner.                                 _______________________           12/29/21 Witness     Signature Of Patient         Date        Hernandez Showers. Christopher                                                 Informing Physician                                           Signature of Informing Physician                              If patient is unable to sign or is a minor, complete one of the following:    (A)  Patient is a minor   years of age. (B)  Patient is unable to sign because: The undersigned represents that he or she is duly authorized to execute this consent for and on behalf of the above named patient. Witness               o  Parent  o  Guardian   o  Spouse       o  Other (specify)                                           Patient Name: Araceli Arizmendi  Patient YOB: 1955  Dr. Rajesh Sanchez' Return To Work Policy  Regarding your ability to return to work after surgery or injury, Dr. Rajesh Sanchez will not state that any patient is off of work or cannot work at all. He will place you on restrictions after your surgical procedure or injury. Depending on the details of your particular situation, Dr. Rajesh Sanchez may state that you will have either light use or no use of your hand for a specific number of weeks. It is your obligation to communicate with your employer regarding your restrictions. It is your employer's decision as to whether they will accommodate your restrictions (i.e. allow you to come to work in your restricted capacity) or to not allow you to return to work under your restrictions. Dr. Rajesh Sanchez does not participate in making this decision and cannot influence your employer regarding their decision. If you do not communicate your restrictions to your employer, or if you do not present to work as you are scheduled to, Dr. Rajesh Sanchez will not provide an 'excuse' to explain your absence. A doctors note, or official forms (BWC, FMLA, etc.) will be filled out, upon request, to indicate your date of surgery and your restrictions as stated above.   Dr. Rajesh Sanchez'

## 2021-12-29 NOTE — PROGRESS NOTES
Mr. Nickolas Fowler is a 77 y.o. right handed man  who is seen today in Hand Surgical Consultation at the request of Lexus Martinez DO. He presents today regarding Right symptoms which have been present for approximately 2 years. A history of antecedent trauma or injury is Absent. He reports symptoms to include moderate numbness & tingling in the Median Innervated Digits. Hand symptoms do Seldom awaken him from sleep. He reports no pain located in the palmar right wrist. Symptoms are worsening over time. Previous treatment has included conservative measures. He does not claim relation of his symptoms to his required work activities. He has undergone electrodiagnostic testing. I have today reviewed with Nickolas Fowler the clinically relevant, past medical history, medications, allergies,  family history, social history, and Review Of Systems & I have documented any details relevant to today's presenting complaints in my history above. Mr. Catarino Chanel self-reported past medical history, medications, allergies,  family history, social history, and Review Of Systems have been scanned into the chart under the \"Media\" tab. Physical Exam:  Mr. Catarino Chanel most recent vitals:  Vitals  Resp: 16  Height: 6' (182.9 cm)  Weight: 185 lb (83.9 kg)    He is well nourished, oriented to person, place & time. He demonstrates appropriate mood and affect as well as normal gait and station. Skin: Normal in appearance, Normal Color and Free of Lesions Bilaterally   Digital range of motion is Full bilaterally  Wrist range of motion is Full bilaterally  There is no evidence of gross joint instability bilaterally. Sensation is subjectively tingling in the Median Innervated Digits on the Right, normal on the Left and objectively present in the same distribution bilaterally.   Vascular examination reveals normal, good capillary refill and good color bilaterally  Swelling is absent in the distal volar forearm bilaterally  Muscular strength is clinically appropriate bilaterally. Examination for Carpal Tunnel Syndrome shows Carpal Tunnel Compression Test to be Mildly Positive on the right & Negative on the left. The patient displays mild baseline symptoms to potentially confound the exam.  The thenar musculature is not atrophied & weakened. Examination for Stenosing Tenosynovitis demonstrates no evidence of tenderness, thickening or nodularity at the A-1 pulleys of the digits bilaterally. There no palpable triggering or any finger or thumb. Review of Electrodiagnostic Testing:  Electrodiagnostic Studies performed by another Physician outside of my practice were Personally Reviewed & Interpreted by myself today. Test performed on: 10/13/2021    NERVE CONDUCTION STUDY:  RIGHT   Median Nerve: Sensory Latency: 4.5  Motor Latency: 4.5  Ulnar Nerve:  Conduction Velocity:  60    LEFT  Median Nerve: Sensory Latency: 3.5  Motor Latency: 2.8  Ulnar Nerve:  Conduction Velocity:  50    EMG:  RIGHT  Not Performed    LEFT  Not Performed    My Interpretation: This study is consistent with: Moderate RIGHT Median Nerve Entrapment at the Carpal Tunnel          Impression:  Mr. Pati Dunn is showing clinical evidence of Carpal Tunnel Syndrome and presents requesting further treatment. Plan:    I have had a thorough discussion with Mr. Pati Dunn regarding the treatment options available for his initially presenting carpal tunnel syndrome, which is causing him significant  limitations. I have outlined for Mr. Pati Dunn the benefits and consequences of the various treatment modalities, including the fact that surgical treatment is the only modality which is reasonably expected to provide long lasting or permanent resolution of his symptoms.   Based upon our current discussion and a reasonable understating of the options available to him, Mr. Pati Dunn has selected to proceed with surgical Carpal Tunnel Release. I have discussed the details of the surgical procedure, the pre, roberto and postoperative concerns and the appropriate expectations after surgery with Mr. Araceli Arizmendi today. He was given the opportunity to ask questions, voiced an understanding of the procedure, and he did wish to proceed with Right Carpal Tunnel Release. I had an extensive discussion with Mr. Araceli Arizmendi (and any family members present with him today) regarding the natural history, etiology, and long term consequences of this problem. We have mutually selected a surgical treatment plan  and, in my opinion, surgical intervention is indicated at this time. I have discussed with him the potential complications, limitations, expectations, alternatives, and risks of Carpal Tunnel Release. He has had full opportunity to ask his questions. I have answered them all to his satisfaction. I feel that Mr. Araceli Arizmendi (and any family members present with him today) do understand our discussion today and he has provided informed consent for Right Carpal Tunnel Release. I have also discussed with Mr. Araceli Arizmendi the other treatment options available to him for this condition. We have today selected to proceed with Surgical treatment. He has voiced and  understanding that there are other less aggressive treatment options which are available in this situation, albeit possibly less efficacious or durable, and he is comfortable with the plan that he has chosen. Mr. Araceli Arizmendi has been given a full verbal list of instructions and precautions related to his present condition. I have asked him to followup with me in the office at the prescribed time. He is also specifically requested to call or return to the office sooner if his symptoms change or worsen prior to the next scheduled appointment.

## 2021-12-29 NOTE — PATIENT INSTRUCTIONS
Pre-Operative Instructions    1. The night before your surgery, unless otherwise instructed, do not eat any food, drink any liquids, chew gum or mints after midnight. Abstain from alcohol for 24 hours prior to surgery. 2. You will be contacted by the Hospital the working day prior to your procedure to confirm your arrival time. 3. Patients under 25years of age must have a parent or legal guardian present to sign their consent and discharge paperwork. 4. On the day of surgery,  you will be seen pre-operatively by an anesthesiologist.     5. If you are having hand surgery, it is recommended that nail polish and acrylic nails be removed prior to surgery if possible. 6. Please bring cases for glasses, contact lenses, hearing aids or dentures. They will likely be removed prior to surgery. 7. Wear casual, loose-fitting and comfortable clothing. Consider that you may have a large dressing to fit under your clothing after surgery. 9. Please do not bring valuables such as jewelry or large sums of cash to the hospital. Remove all body piercings before coming to the hospital. Uday Dutton may not  wear any rings on the hand if you are having surgery on that hand, wrist or elbow. 10. Do not smoke or chew tobacco before your surgery. [address] [place] [address] and surgery facilities are smoke-free environments. Smoking is not permitted anywhere on campus. 11. Be sure to follow any additional instructions from your physician. If the above conditions are not met, your surgery may be cancelled and rescheduled for another day. Should you develop any change in your health such as fever, cough, sore throat, cold, flu, or infection, or if you have any questions regarding your Pre-admission or surgery, please contact [facility] - Surgery Scheduling at [phone], Monday through Friday, 9 a.m. to 5 p.m.

## 2021-12-29 NOTE — LETTER
Ordered       []  ----  Give the following Antibiotic within 1 hour prior to start time:         Ancef 1 gram IV if patient is less than 200 pounds    or       Ancef 2 grams IV if patient is greater than 200 pounds    or      Vancomycin 1 gram IV (over 1 hour) if patient is allergic to           PENICILLINS or CEFALOSPORINS        SURG                                    COVID                         H&P AT PCP    Procedure: right Carpal Tunnel Release      Patient: June Natalie  :    1955    Physician Signature:     Date: 21  Time: 11:14 AM

## 2021-12-30 ENCOUNTER — TELEPHONE (OUTPATIENT)
Dept: ORTHOPEDIC SURGERY | Age: 66
End: 2021-12-30

## 2022-01-05 ENCOUNTER — TELEPHONE (OUTPATIENT)
Dept: ORTHOPEDIC SURGERY | Age: 67
End: 2022-01-05

## 2022-01-05 NOTE — TELEPHONE ENCOUNTER
the patient informed that surgery is on hold due to the rise in Covid cases. Their file will be held and I will call them once we have the all clear to schedule electives again.

## 2022-01-19 DIAGNOSIS — G56.01 CARPAL TUNNEL SYNDROME OF RIGHT WRIST: Primary | ICD-10-CM

## 2022-01-20 ENCOUNTER — TELEPHONE (OUTPATIENT)
Dept: ORTHOPEDIC SURGERY | Age: 67
End: 2022-01-20

## 2022-01-25 ENCOUNTER — TELEPHONE (OUTPATIENT)
Dept: ORTHOPEDIC SURGERY | Age: 67
End: 2022-01-25

## 2022-02-25 ENCOUNTER — NURSE ONLY (OUTPATIENT)
Dept: FAMILY MEDICINE CLINIC | Age: 67
End: 2022-02-25
Payer: COMMERCIAL

## 2022-02-25 DIAGNOSIS — Z23 NEED FOR SHINGLES VACCINE: Primary | ICD-10-CM

## 2022-02-25 PROCEDURE — 90750 HZV VACC RECOMBINANT IM: CPT | Performed by: FAMILY MEDICINE

## 2022-02-25 PROCEDURE — 90471 IMMUNIZATION ADMIN: CPT | Performed by: FAMILY MEDICINE

## 2022-03-08 ENCOUNTER — TELEPHONE (OUTPATIENT)
Dept: ORTHOPEDIC SURGERY | Age: 67
End: 2022-03-08

## 2022-03-08 ENCOUNTER — OFFICE VISIT (OUTPATIENT)
Dept: FAMILY MEDICINE CLINIC | Age: 67
End: 2022-03-08
Payer: COMMERCIAL

## 2022-03-08 VITALS
DIASTOLIC BLOOD PRESSURE: 66 MMHG | HEART RATE: 60 BPM | TEMPERATURE: 97.3 F | SYSTOLIC BLOOD PRESSURE: 138 MMHG | HEIGHT: 72 IN | OXYGEN SATURATION: 97 % | BODY MASS INDEX: 25.41 KG/M2 | WEIGHT: 187.6 LBS

## 2022-03-08 DIAGNOSIS — G56.01 RIGHT CARPAL TUNNEL SYNDROME: ICD-10-CM

## 2022-03-08 DIAGNOSIS — Z01.818 PREOP EXAMINATION: Primary | ICD-10-CM

## 2022-03-08 PROCEDURE — 99212 OFFICE O/P EST SF 10 MIN: CPT | Performed by: FAMILY MEDICINE

## 2022-03-08 ASSESSMENT — ENCOUNTER SYMPTOMS
SINUS PRESSURE: 0
ABDOMINAL PAIN: 0
EYE PAIN: 0
RHINORRHEA: 0
EYE DISCHARGE: 0
COLOR CHANGE: 0
COUGH: 0
BLOOD IN STOOL: 0
EYE REDNESS: 0
NAUSEA: 0
CONSTIPATION: 0
CHEST TIGHTNESS: 0
VOMITING: 0
SORE THROAT: 0
WHEEZING: 0
DIARRHEA: 0
SINUS PAIN: 0
SHORTNESS OF BREATH: 0
ABDOMINAL DISTENTION: 0
APNEA: 0

## 2022-03-08 NOTE — PROGRESS NOTES
Subjective:      Patient ID: Shireen Urban is a 79 y.o. male. HPI  Chief Complaint   Patient presents with    Pre-op Exam     Patient is here for a pre-op exam.  He is scheduled to have Right Carpal Tunnel Release surgery with  on 3/29/22 at [facility]. Fax# [phone]     For preoperative examination for carpal tunnel surgery on 3/29/2022. Non-smoker. Denies any problems with anesthesia. Denies any current chest pain, shortness of breath, orthopnea or dyspnea on exertion  Shireen Urban is a 79 y.o. male with the following history as recorded in [facility]:  Patient Active Problem List    Diagnosis Date Noted    Enlarged prostate 11/18/2020    Elevated PSA 11/18/2020    Mixed hyperlipidemia 01/16/2017    S/P MVR (mitral valve repair) 06/30/2014     Current Outpatient Medications   Medication Sig Dispense Refill    atorvastatin (LIPITOR) 20 MG tablet TAKE 1 TABLET BY MOUTH EVERY NIGHT FOR HIGH CHOLESTEROL 90 tablet 3    aspirin 81 MG tablet Take 81 mg by mouth daily.  Multiple Vitamins-Minerals (THERAPEUTIC MULTIVITAMIN-MINERALS) tablet Take 1 tablet by mouth daily. No current facility-administered medications for this visit. Allergies: Patient has no known allergies. Past Medical History:   Diagnosis Date    Adenomatous colon polyp 2009    Dyslipidemia     History of colon polyps     Status post mitral valve repair 2006     Past Surgical History:   Procedure Laterality Date    COLONOSCOPY  12/04/09    Adenoma    FRACTURE SURGERY Right 1983    fractured humerus, plate    MITRAL VALVE SURGERY  2006    Dr. Anthony Clemens follows - Dr. Stephen Landeros surgeon, at [facility] History   Problem Relation Age of Onset    Kidney Disease Brother     Cancer Neg Hx      Social History     Tobacco Use    Smoking status: Never Smoker    Smokeless tobacco: Never Used   Substance Use Topics    Alcohol use:  Yes     Alcohol/week: 0.0 standard drinks     Comment: rarely - 2-4/month Vitals:    03/08/22 1606   BP: 138/66   Pulse: 60   Temp: 97.3 °F (36.3 °C)   TempSrc: Temporal   SpO2: 97%   Weight: 187 lb 9.6 oz (85.1 kg)   Height: 6' (1.829 m)     Body mass index is 25.44 kg/m². Wt Readings from Last 3 Encounters:   03/08/22 187 lb 9.6 oz (85.1 kg)   12/29/21 185 lb (83.9 kg)   11/22/21 185 lb (83.9 kg)     BP Readings from Last 3 Encounters:   03/08/22 138/66   11/22/21 120/78   11/18/20 128/72        Review of Systems   Constitutional: Negative for activity change, chills, diaphoresis, fatigue, fever and unexpected weight change. HENT: Negative for congestion, dental problem, ear discharge, ear pain, hearing loss, rhinorrhea, sinus pressure, sinus pain, sore throat and tinnitus. Eyes: Negative for pain, discharge, redness and visual disturbance. Respiratory: Negative for apnea, cough, chest tightness, shortness of breath and wheezing. Cardiovascular: Negative for chest pain, palpitations and leg swelling. Gastrointestinal: Negative for abdominal distention, abdominal pain, blood in stool, constipation, diarrhea, nausea and vomiting. Genitourinary: Negative for difficulty urinating, dysuria, frequency, hematuria and urgency. Musculoskeletal: Negative for gait problem, joint swelling and myalgias. Skin: Negative for color change and wound. Allergic/Immunologic: Negative for immunocompromised state. Neurological: Positive for numbness. Negative for dizziness, tremors, seizures, syncope, weakness, light-headedness and headaches. Hematological: Negative for adenopathy. Psychiatric/Behavioral: Negative for agitation, confusion, dysphoric mood, hallucinations, self-injury and sleep disturbance. The patient is not nervous/anxious. Objective:   Physical Exam  Constitutional:       General: He is not in acute distress. Appearance: Normal appearance. He is not ill-appearing. HENT:      Head: Normocephalic and atraumatic.       Right Ear: Tympanic membrane and ear canal normal. There is no impacted cerumen. Left Ear: Tympanic membrane and ear canal normal. There is no impacted cerumen. Nose: Nose normal.      Mouth/Throat:      Mouth: Mucous membranes are moist.      Pharynx: No posterior oropharyngeal erythema. Eyes:      General:         Right eye: No discharge. Left eye: No discharge. Extraocular Movements: Extraocular movements intact. Pupils: Pupils are equal, round, and reactive to light. Neck:      Vascular: No carotid bruit. Cardiovascular:      Rate and Rhythm: Normal rate and regular rhythm. Pulses: Normal pulses. Heart sounds: Normal heart sounds. No murmur heard. No gallop. Pulmonary:      Effort: Pulmonary effort is normal. No respiratory distress. Breath sounds: Normal breath sounds. No wheezing or rales. Abdominal:      General: Bowel sounds are normal. There is no distension. Palpations: Abdomen is soft. Tenderness: There is no abdominal tenderness. There is no guarding or rebound. Hernia: No hernia is present. Musculoskeletal:         General: No swelling, tenderness or deformity. Cervical back: Normal range of motion and neck supple. No rigidity or tenderness. Right lower leg: No edema. Left lower leg: No edema. Lymphadenopathy:      Cervical: No cervical adenopathy. Skin:     General: Skin is warm. Coloration: Skin is not jaundiced or pale. Findings: No bruising or erythema. Neurological:      General: No focal deficit present. Mental Status: He is alert and oriented to person, place, and time. Sensory: No sensory deficit. Motor: No weakness.       Coordination: Coordination normal.      Gait: Gait normal.   Psychiatric:         Mood and Affect: Mood normal.         Behavior: Behavior normal.         Assessment:      preop  r carpal tunnel      Plan:      Patient appears to be medically stable for planned procedure without further evaluation   Will be faxed to Dr. Denton Fraire and [facility],

## 2022-03-21 NOTE — PROGRESS NOTES
Surjit Hernandezler    Age 79 y.o.    male    1955    N [id number]    3/29/2022  Arrival Time_____________  OR Time____________25 Op[address] Moelizabeth     Procedure(s):  RIGHT CARPAL TUNNEL RELEASE                      Monitor Anesthesia Care    Surgeon(s):  Jyoti Magana, MD       Phone [phone] (home) [phone] (work)    [address] [place] [address] House [facility]  Cell         Work  _____________________________________________________________________  _____________________________________________________________________  _____________________________________________________________________  _____________________________________________________________________  _____________________________________________________________________    PCP _____________________________ Phone_________________     H&P__________________Bringing      Chart            Epic   DOS      Called________  EKG__________________Bringing      Chart            Epic   DOS      Called________  LAB__________________ Bringing      Chart            Epic   DOS      Called________  Cardiac Clearance_______Bringing      Chart            Epic      DOS      Called________    Cardiologist________________________ Phone___________________________    ? Mormonism concerns / Waiver on Chart            [employer]________________  ? Pre-op Instructions Given [place]          _________________________________  ? Directions to Surgery Center                          _________________________________  ? Transportation Home_______________      __________________________________  ?  Crutches/Walker__________________        __________________________________    ________Pre-op Orders   _______Transcribed    _______Wt.  ________Pharmacy          _______SCD  ______VTE     ______TED Merline Hoof  _______  Surgery Consent    _______  Anesthesia Consent         COVID DATE______________LOCATION________________ RESULT__________

## 2022-03-22 NOTE — PROGRESS NOTES
Obstructive Sleep Apnea (KATRIN) Screening     Patient:  Jose Thompson    YOB: 1955      Medical Record #:  [id number]                     Date:  3/22/2022     1. Are you a loud and/or regular snorer? []  Yes       [x] No    2. Have you been observed to gasp or stop breathing during sleep? []  Yes       [x] No    3. Do you feel tired or groggy upon awakening or do you awaken with a headache?           []  Yes       [] No    4. Are you often tired or fatigued during the wake time hours? []  Yes       [] No    5. Do you fall asleep sitting, reading, watching TV or driving? []  Yes       [] No    6. Do you often have problems with memory or concentration? []  Yes       [] No    **If patient's score is ? 3 they are considered high risk for KATRIN. An Anesthesia provider will evaluate the patient and develop a plan of care the day of surgery. Note:  If the patient's BMI is more than 35 kg m¯² , has neck circumference > 40 cm, and/or high blood pressure the risk is greater (© American Sleep Apnea Association, 2006).

## 2022-03-25 ENCOUNTER — ANESTHESIA EVENT (OUTPATIENT)
Dept: OPERATING ROOM | Age: 67
End: 2022-03-25
Payer: COMMERCIAL

## 2022-03-29 ENCOUNTER — HOSPITAL ENCOUNTER (OUTPATIENT)
Age: 67
Setting detail: OUTPATIENT SURGERY
Discharge: HOME | End: 2022-03-29
Attending: ORTHOPAEDIC SURGERY | Admitting: ORTHOPAEDIC SURGERY
Payer: COMMERCIAL

## 2022-03-29 ENCOUNTER — ANESTHESIA (OUTPATIENT)
Dept: OPERATING ROOM | Age: 67
End: 2022-03-29
Payer: COMMERCIAL

## 2022-03-29 VITALS — SYSTOLIC BLOOD PRESSURE: 114 MMHG | OXYGEN SATURATION: 94 % | DIASTOLIC BLOOD PRESSURE: 63 MMHG

## 2022-03-29 VITALS
TEMPERATURE: 98.4 F | RESPIRATION RATE: 16 BRPM | HEIGHT: 72 IN | DIASTOLIC BLOOD PRESSURE: 66 MMHG | BODY MASS INDEX: 26.68 KG/M2 | SYSTOLIC BLOOD PRESSURE: 124 MMHG | WEIGHT: 197 LBS | OXYGEN SATURATION: 97 % | HEART RATE: 80 BPM

## 2022-03-29 PROCEDURE — 2500000003 HC RX 250 WO HCPCS: Performed by: ORTHOPAEDIC SURGERY

## 2022-03-29 PROCEDURE — 2580000003 HC RX 258: Performed by: ANESTHESIOLOGY

## 2022-03-29 PROCEDURE — A4217 STERILE WATER/SALINE, 500 ML: HCPCS | Performed by: ORTHOPAEDIC SURGERY

## 2022-03-29 PROCEDURE — 3700000000 HC ANESTHESIA ATTENDED CARE: Performed by: ORTHOPAEDIC SURGERY

## 2022-03-29 PROCEDURE — 2580000003 HC RX 258: Performed by: ORTHOPAEDIC SURGERY

## 2022-03-29 PROCEDURE — 3700000001 HC ADD 15 MINUTES (ANESTHESIA): Performed by: ORTHOPAEDIC SURGERY

## 2022-03-29 PROCEDURE — 7100000010 HC PHASE II RECOVERY - FIRST 15 MIN: Performed by: ORTHOPAEDIC SURGERY

## 2022-03-29 PROCEDURE — 3600000015 HC SURGERY LEVEL 5 ADDTL 15MIN: Performed by: ORTHOPAEDIC SURGERY

## 2022-03-29 PROCEDURE — 2709999900 HC NON-CHARGEABLE SUPPLY: Performed by: ORTHOPAEDIC SURGERY

## 2022-03-29 PROCEDURE — 3600000005 HC SURGERY LEVEL 5 BASE: Performed by: ORTHOPAEDIC SURGERY

## 2022-03-29 PROCEDURE — 6360000002 HC RX W HCPCS: Performed by: NURSE ANESTHETIST, CERTIFIED REGISTERED

## 2022-03-29 PROCEDURE — 2500000003 HC RX 250 WO HCPCS: Performed by: NURSE ANESTHETIST, CERTIFIED REGISTERED

## 2022-03-29 PROCEDURE — 64721 CARPAL TUNNEL SURGERY: CPT | Performed by: ORTHOPAEDIC SURGERY

## 2022-03-29 PROCEDURE — 7100000011 HC PHASE II RECOVERY - ADDTL 15 MIN: Performed by: ORTHOPAEDIC SURGERY

## 2022-03-29 RX ORDER — SODIUM CHLORIDE 0.9 % (FLUSH) 0.9 %
10 SYRINGE (ML) INJECTION EVERY 12 HOURS SCHEDULED
Status: DISCONTINUED | OUTPATIENT
Start: 2022-03-29 | End: 2022-03-29 | Stop reason: HOSPADM

## 2022-03-29 RX ORDER — LIDOCAINE HYDROCHLORIDE 10 MG/ML
INJECTION, SOLUTION EPIDURAL; INFILTRATION; INTRACAUDAL; PERINEURAL PRN
Status: DISCONTINUED | OUTPATIENT
Start: 2022-03-29 | End: 2022-03-29 | Stop reason: SDUPTHER

## 2022-03-29 RX ORDER — SODIUM CHLORIDE, SODIUM LACTATE, POTASSIUM CHLORIDE, CALCIUM CHLORIDE 600; 310; 30; 20 MG/100ML; MG/100ML; MG/100ML; MG/100ML
INJECTION, SOLUTION INTRAVENOUS CONTINUOUS
Status: DISCONTINUED | OUTPATIENT
Start: 2022-03-29 | End: 2022-03-29 | Stop reason: HOSPADM

## 2022-03-29 RX ORDER — LABETALOL HYDROCHLORIDE 5 MG/ML
5 INJECTION, SOLUTION INTRAVENOUS EVERY 10 MIN PRN
Status: DISCONTINUED | OUTPATIENT
Start: 2022-03-29 | End: 2022-03-29 | Stop reason: HOSPADM

## 2022-03-29 RX ORDER — SODIUM CHLORIDE 0.9 % (FLUSH) 0.9 %
5-40 SYRINGE (ML) INJECTION PRN
Status: DISCONTINUED | OUTPATIENT
Start: 2022-03-29 | End: 2022-03-29 | Stop reason: HOSPADM

## 2022-03-29 RX ORDER — LIDOCAINE HYDROCHLORIDE 10 MG/ML
1 INJECTION, SOLUTION EPIDURAL; INFILTRATION; INTRACAUDAL; PERINEURAL
Status: DISCONTINUED | OUTPATIENT
Start: 2022-03-29 | End: 2022-03-29 | Stop reason: HOSPADM

## 2022-03-29 RX ORDER — ONDANSETRON 2 MG/ML
4 INJECTION INTRAMUSCULAR; INTRAVENOUS
Status: DISCONTINUED | OUTPATIENT
Start: 2022-03-29 | End: 2022-03-29 | Stop reason: HOSPADM

## 2022-03-29 RX ORDER — MEPERIDINE HYDROCHLORIDE 50 MG/ML
12.5 INJECTION INTRAMUSCULAR; INTRAVENOUS; SUBCUTANEOUS EVERY 5 MIN PRN
Status: DISCONTINUED | OUTPATIENT
Start: 2022-03-29 | End: 2022-03-29 | Stop reason: HOSPADM

## 2022-03-29 RX ORDER — SODIUM CHLORIDE 0.9 % (FLUSH) 0.9 %
5-40 SYRINGE (ML) INJECTION EVERY 12 HOURS SCHEDULED
Status: DISCONTINUED | OUTPATIENT
Start: 2022-03-29 | End: 2022-03-29 | Stop reason: HOSPADM

## 2022-03-29 RX ORDER — MAGNESIUM HYDROXIDE 1200 MG/15ML
LIQUID ORAL CONTINUOUS PRN
Status: COMPLETED | OUTPATIENT
Start: 2022-03-29 | End: 2022-03-29

## 2022-03-29 RX ORDER — SODIUM CHLORIDE 9 MG/ML
INJECTION, SOLUTION INTRAVENOUS PRN
Status: DISCONTINUED | OUTPATIENT
Start: 2022-03-29 | End: 2022-03-29 | Stop reason: HOSPADM

## 2022-03-29 RX ORDER — SODIUM CHLORIDE 0.9 % (FLUSH) 0.9 %
10 SYRINGE (ML) INJECTION PRN
Status: DISCONTINUED | OUTPATIENT
Start: 2022-03-29 | End: 2022-03-29 | Stop reason: HOSPADM

## 2022-03-29 RX ORDER — PROPOFOL 10 MG/ML
INJECTION, EMULSION INTRAVENOUS PRN
Status: DISCONTINUED | OUTPATIENT
Start: 2022-03-29 | End: 2022-03-29 | Stop reason: SDUPTHER

## 2022-03-29 RX ORDER — DIPHENHYDRAMINE HYDROCHLORIDE 50 MG/ML
12.5 INJECTION INTRAMUSCULAR; INTRAVENOUS
Status: DISCONTINUED | OUTPATIENT
Start: 2022-03-29 | End: 2022-03-29 | Stop reason: HOSPADM

## 2022-03-29 RX ORDER — SODIUM CHLORIDE 9 MG/ML
25 INJECTION, SOLUTION INTRAVENOUS PRN
Status: DISCONTINUED | OUTPATIENT
Start: 2022-03-29 | End: 2022-03-29 | Stop reason: HOSPADM

## 2022-03-29 RX ORDER — OXYCODONE HYDROCHLORIDE 5 MG/1
10 TABLET ORAL PRN
Status: DISCONTINUED | OUTPATIENT
Start: 2022-03-29 | End: 2022-03-29 | Stop reason: HOSPADM

## 2022-03-29 RX ORDER — OXYCODONE HYDROCHLORIDE 5 MG/1
5 TABLET ORAL PRN
Status: DISCONTINUED | OUTPATIENT
Start: 2022-03-29 | End: 2022-03-29 | Stop reason: HOSPADM

## 2022-03-29 RX ADMIN — PROPOFOL 50 MG: 10 INJECTION, EMULSION INTRAVENOUS at 07:21

## 2022-03-29 RX ADMIN — SODIUM CHLORIDE, POTASSIUM CHLORIDE, SODIUM LACTATE AND CALCIUM CHLORIDE: 600; 310; 30; 20 INJECTION, SOLUTION INTRAVENOUS at 06:26

## 2022-03-29 RX ADMIN — PROPOFOL 250 MG: 10 INJECTION, EMULSION INTRAVENOUS at 07:17

## 2022-03-29 RX ADMIN — LIDOCAINE HYDROCHLORIDE 50 MG: 10 INJECTION, SOLUTION EPIDURAL; INFILTRATION; INTRACAUDAL; PERINEURAL at 07:17

## 2022-03-29 RX ADMIN — PROPOFOL 50 MG: 10 INJECTION, EMULSION INTRAVENOUS at 07:24

## 2022-03-29 ASSESSMENT — PULMONARY FUNCTION TESTS
PIF_VALUE: 1
PIF_VALUE: 0
PIF_VALUE: 1
PIF_VALUE: 0
PIF_VALUE: 1
PIF_VALUE: 0
PIF_VALUE: 1

## 2022-03-29 ASSESSMENT — PAIN SCALES - GENERAL
PAINLEVEL_OUTOF10: 0
PAINLEVEL_OUTOF10: 0

## 2022-03-29 ASSESSMENT — PAIN - FUNCTIONAL ASSESSMENT: PAIN_FUNCTIONAL_ASSESSMENT: 0-10

## 2022-03-29 NOTE — H&P
Pre-operative Update of H&P:    I  have seen & examined Mr. Bernice Alcantar related solely to his hand and upper extremity conditions, prior to the scheduled procedure on the date of his surgery. The indications for the planned surgical procedure & and his upper-extremity condition are unchanged.

## 2022-03-29 NOTE — OP NOTE
OPERATIVE REPORT          Patient:  Tanner Galan    YOB: 1955  Date of Service:  3/29/2022   Location:  [facility]    Preoperative Diagnosis:    Right carpal tunnel syndrome    Postoperative Diagnosis:    Same    Procedure:    Right carpal tunnel release      Surgeon:    Curtis Hamilton. Jett Barksdale MD    Surgical Assistant:    LAYLA Tineo Assistant    Anesthesia:   Local with Sedation    Blood Loss:   Minimal    Complications:  None    Tourniquet Time: 3 minutes     Indications:  Mr. Tanner Galan  is a 79y.o. year-old male with Right carpal tunnel syndrome. I have discussed preoperatively with him  the complications, limitations, expectations, alternatives and risks of surgical care, which he has understood. All of his questions have been fully answered, and he has provided written informed consent to proceed. Procedure:   After written consent was obtained and the proper operative site was identified and marked, Mr. Tanner Galan was brought to the operating room, placed in the supine position on the operating room table with the Right arm extended upon a hand table. Under an appropriate level of sedation, local anesthetic (1% Lidocaine and 1/2% Marcaine both without Epinephrine) was instilled in the planned surgical field. His Right upper extremity was prepped and draped in the usual sterile fashion. After Esmarch exsanguination, the pneumotourniquet was inflated to 250 mm of mercury. A 2 cm longitudinal incision was fashioned at the base of the palm, paralleling the longitudinal thenar crease. Dissection was carried carefully through the subcutaneous tissue identifying and protecting the neurovascular structures. The palmar fascia was incised longitudinally, exposing the transverse carpal ligament. The transverse carpal ligament was incised from its proximal to distal most extent, under direct visualization.  The terminal 2 cm of antebrachial fascia was similarly incised under direct visualization. The contents of the carpal tunnel were inspected and found to be free of mass, lesion or other abnormality. Digital palpation revealed no further constriction about the median nerve. The wound was irrigated copiously with sterile saline for irrigation and the pneumotourniquet was deflated after a period of 3 minutes elevation. The fingers were immediately pink & well perfused. Hemostasis was easily obtained with direct pressure and electrocautery and the wound was closed with interrupted sutures. The wound was dressed with adaptic, dry sterile dressings and a bulky soft hand & wrist dressing was applied. Mr. Tayler Birch  was awakened from light sedation, having tolerated the procedure without apparent complication. He  was returned to the recovery room in stable condition. At the conclusion of the procedure all needle, instrument, and sponge counts were correct. Evens Briceno MD   3/29/2022, 7:28 AM

## 2022-03-29 NOTE — ANESTHESIA POSTPROCEDURE EVALUATION
Department of Anesthesiology  Postprocedure Note    Patient: Christel Corrales  MRN: [id number]  YOB: 1955  Date of evaluation: 3/29/2022  Time:  10:51 AM     Procedure Summary     Date: 03/29/22 Room / Location: [address] [place] [address]    Anesthesia Start: 0715 Anesthesia Stop: 0899    Procedure: RIGHT CARPAL TUNNEL RELEASE (Right Fingers) Diagnosis:       Right carpal tunnel syndrome      (Right carpal tunnel syndrome [G56.01])    Surgeons: Audra Atkinson MD Responsible Provider: Sharan Barajas MD    Anesthesia Type: MAC ASA Status: 2          Anesthesia Type: MAC    Lanette Phase I: Lanette Score: 10    Lanette Phase II: Lanette Score: 10    Last vitals: Reviewed and per EMR flowsheets.        Anesthesia Post Evaluation    Patient location during evaluation: PACU  Patient participation: complete - patient participated  Level of consciousness: awake and alert  Pain score: 0  Airway patency: patent  Nausea & Vomiting: no nausea and no vomiting  Complications: no  Cardiovascular status: blood pressure returned to baseline  Respiratory status: acceptable  Hydration status: stable

## 2022-03-29 NOTE — ANESTHESIA PRE PROCEDURE
Department of Anesthesiology  Preprocedure Note       Name:  Josephine Vazquez   Age:  79 y.o.  :  1955                                          MRN:  [id number]         Date:  3/29/2022      Surgeon: Livan Rodriguez):  Lisa Trevino MD    Procedure: Procedure(s):  RIGHT CARPAL TUNNEL RELEASE    Medications prior to admission:   Prior to Admission medications    Medication Sig Start Date End Date Taking? Authorizing Provider   atorvastatin (LIPITOR) 20 MG tablet TAKE 1 TABLET BY MOUTH EVERY NIGHT FOR HIGH CHOLESTEROL 21   Sonja Castellanos DO   aspirin 81 MG tablet Take 81 mg by mouth daily. Historical Provider, MD   Multiple Vitamins-Minerals (THERAPEUTIC MULTIVITAMIN-MINERALS) tablet Take 1 tablet by mouth daily.     Historical Provider, MD       Current medications:    Current Facility-Administered Medications   Medication Dose Route Frequency Provider Last Rate Last Admin    lidocaine PF 1 % injection 1 mL  1 mL IntraDERmal Once PRN Garfield Sandhu MD        lactated ringers infusion   IntraVENous Continuous Garfield Sandhu  mL/hr at 22 0626 New Bag at 22 0626    sodium chloride flush 0.9 % injection 10 mL  10 mL IntraVENous 2 times per day Garfield Sandhu MD        sodium chloride flush 0.9 % injection 10 mL  10 mL IntraVENous PRN Garfield Sandhu MD        0.9 % sodium chloride infusion  25 mL IntraVENous PRN Garfield Sandhu MD        bupivacaine (MARCAINE) 5 mL, lidocaine PF 1 % 5 mL    PRN Lisa Trevino MD   Given at 22 0703       Allergies:  No Known Allergies    Problem List:    Patient Active Problem List   Diagnosis Code    S/P MVR (mitral valve repair) Z98.890    Mixed hyperlipidemia E78.2    Enlarged prostate N40.0    Elevated PSA R97.20       Past Medical History:        Diagnosis Date    Adenomatous colon polyp 2009    Arthritis     Dyslipidemia     History of colon polyps     Hyperlipidemia     Status post mitral valve repair        Past Surgical History:        Procedure Laterality Date    COLONOSCOPY  12/04/09    Adenoma    FRACTURE SURGERY Right 1983    fractured humerus, plate    MITRAL VALVE SURGERY  2006    Dr. Paco Shepard follows - Dr. Fanny Renee surgeon, at [address] History:    Social History     Tobacco Use    Smoking status: Never Smoker    Smokeless tobacco: Never Used   Substance Use Topics    Alcohol use: Yes     Comment: 2-4 drinks per month                                Counseling given: Not Answered      Vital Signs (Current):   Vitals:    03/22/22 1056 03/29/22 0620   BP:  119/69   Pulse:  79   Resp:  16   Temp:  97.5 °F (36.4 °C)   TempSrc:  Temporal   SpO2:  96%   Weight: 187 lb (84.8 kg) 197 lb (89.4 kg)   Height: 6' (1.829 m) 6' (1.829 m)                                              BP Readings from Last 3 Encounters:   03/29/22 119/69   03/08/22 138/66   11/22/21 120/78       NPO Status: Time of last liquid consumption: 2200                        Time of last solid consumption: 2200                        Date of last liquid consumption: 03/28/22                        Date of last solid food consumption: 03/28/22    BMI:   Wt Readings from Last 3 Encounters:   03/29/22 197 lb (89.4 kg)   03/08/22 187 lb 9.6 oz (85.1 kg)   12/29/21 185 lb (83.9 kg)     Body mass index is 26.72 kg/m².     CBC:   Lab Results   Component Value Date    WBC 6.1 09/19/2019    RBC 5.42 09/19/2019    HGB 15.8 09/19/2019    HCT 47.3 09/19/2019    MCV 87.3 09/19/2019    RDW 13.7 09/19/2019     09/19/2019       CMP:   Lab Results   Component Value Date     11/22/2021    K 4.6 11/22/2021     11/22/2021    CO2 23 11/22/2021    BUN 20 11/22/2021    CREATININE 0.8 11/22/2021    GFRAA >60 11/22/2021    AGRATIO 2.0 11/22/2021    LABGLOM >60 11/22/2021    GLUCOSE 81 11/22/2021    PROT 6.8 11/22/2021    CALCIUM 9.6 11/22/2021    BILITOT 0.7 11/22/2021    ALKPHOS 77 11/22/2021    AST 20 11/22/2021    ALT 27 11/22/2021       POC Tests: No results for input(s): POCGLU, POCNA, POCK, POCCL, POCBUN, POCHEMO, POCHCT in the last 72 hours. Coags: No results found for: PROTIME, INR, APTT    HCG (If Applicable): No results found for: PREGTESTUR, PREGSERUM, HCG, HCGQUANT     ABGs: No results found for: PHART, PO2ART, PRT1SJM, GXR2AAT, BEART, M2RGNFCS     Type & Screen (If Applicable):  No results found for: LABABO, LABRH    Drug/Infectious Status (If Applicable):  No results found for: HIV, HEPCAB    COVID-19 Screening (If Applicable): No results found for: COVID19        Anesthesia Evaluation  Patient summary reviewed and Nursing notes reviewed  Airway: Mallampati: I  TM distance: >3 FB   Neck ROM: full  Mouth opening: > = 3 FB Dental: normal exam         Pulmonary:Negative Pulmonary ROS and normal exam  breath sounds clear to auscultation                             Cardiovascular:Negative CV ROS            Rhythm: regular  Rate: normal                    Neuro/Psych:   Negative Neuro/Psych ROS              GI/Hepatic/Renal: Neg GI/Hepatic/Renal ROS            Endo/Other: Negative Endo/Other ROS                    Abdominal:             Vascular: negative vascular ROS. Other Findings:             Anesthesia Plan      MAC     ASA 2       Induction: intravenous. MIPS: Postoperative opioids intended and Prophylactic antiemetics administered. Anesthetic plan and risks discussed with patient. Plan discussed with CRNA.                   Trini Goins MD   3/29/2022

## 2022-04-06 ENCOUNTER — OFFICE VISIT (OUTPATIENT)
Dept: ORTHOPEDIC SURGERY | Age: 67
End: 2022-04-06

## 2022-04-06 VITALS — WEIGHT: 197 LBS | BODY MASS INDEX: 26.68 KG/M2 | RESPIRATION RATE: 16 BRPM | HEIGHT: 72 IN

## 2022-04-06 DIAGNOSIS — G56.01 CARPAL TUNNEL SYNDROME OF RIGHT WRIST: Primary | ICD-10-CM

## 2022-04-06 PROCEDURE — 99024 POSTOP FOLLOW-UP VISIT: CPT | Performed by: ORTHOPAEDIC SURGERY

## 2022-04-06 NOTE — PROGRESS NOTES
Mr. John Beauchamp returns today in follow-up of his recent right Carpal Tunnel Release done approximately 1 week ago. He has done well noting no discomfort and no other reported complications. He notes pre-operative symptoms to be significantly improved at this time. Physical Exam:  Bandage intact and well cared for  Skin incision is healing well, without erythema, drainage or sign of infection. Digital range of motion is Full. Wrist range of motion is Full. Sensation is improved from preoperatvely, residual numbness at the distal end of first, middle, and thumb. Vascular examination reveals normal, good capillary refill and good color. Swelling is minimal.  Sensory and Motor Median Nerve function is intact. Impression:  Mr. John Beauchamp is doing well after recent right Carpal Tunnel Release. Plan:  Mr. John Beauchamp is instructed in work on Active & Passive range of motion of the digits, wrist, & elbow. These modalities were specifically demonstrated to him today. We discussed the appropriateness of gradual resumption of use of the operated hand and the return to normal use as comfort allows. He is given instructions regarding management of the fresh surgical incision and progressive use of desensitization and tissue massage techniques. We discussed the appropriate expectations and timeline for symptom improvement. He is provided a written patient instruction sheet titled: Postoperative Instructions After Carpal Tunnel Release. I have asked Mr. John Beauchamp to follow-up with me or contact me by telephone over the next 2-4 weeks if his symptoms have not fully resolved or if he has not regained full & painless return of function. He is also specifically instructed to return to the office or call for an appointment sooner if his symptoms are changing or worsening prior to that time.

## 2022-04-06 NOTE — PATIENT INSTRUCTIONS
Postoperative Instructions After Carpal Tunnel Release    Dr. Shante White        1. After bandages are removed one week from surgery, you may chose to wear a small bandage over the incision if you wish, though you do not need to. 2. Keep incision dry until sutures have fully dissolved  or it has been 12 - 14 days since your surgery. Thereafter, you may wash with mild soap and water and shower normally. 3. Work hard on motion of the fingers and wrist, straightening each finger fully and bending each finger fully, bending wrist forward and bending wrist backwards. Do not be concerned if you experience discomfort. This will not damage the surgery. 4. You may begin using the hand as it feels comfortable beginning 12 - 14 days from the day of surgery. You may not feel entirely comfortable gripping or lifting heavy objects for several weeks. 5. You may expect to see some skin peel off around the incision. You may be left with a small area of pink baby skin. This is quite normal.  6. Once your stiches have fully disappeared & skin appears normal, you should begin gently massaging the incision with Vitamin E (may use Vitamin E lotion or contents of Vitamin E capsule). Thank you for choosing [facility]icians for your Hand and Upper Extremity needs. If we can be of any further assistance to you, please do not hesitate to contact us.     Office Phone Number:  [phone]  or  [phone]

## 2022-10-28 ENCOUNTER — TELEPHONE (OUTPATIENT)
Dept: FAMILY MEDICINE CLINIC | Age: 67
End: 2022-10-28

## 2022-10-28 NOTE — TELEPHONE ENCOUNTER
LM for pt, his appt was scheduled too soon for physical, needs to be after 11/22 for insurance approval.

## 2022-11-16 DIAGNOSIS — E78.2 MIXED HYPERLIPIDEMIA: ICD-10-CM

## 2022-11-16 RX ORDER — ATORVASTATIN CALCIUM 20 MG/1
TABLET, FILM COATED ORAL
Qty: 30 TABLET | Refills: 0 | Status: SHIPPED | OUTPATIENT
Start: 2022-11-16 | End: 2022-11-23 | Stop reason: SDUPTHER

## 2022-11-16 NOTE — TELEPHONE ENCOUNTER
Pt is scheduled to establish with Ping Arellano on 11/23/22    Madhav Fitzpatrick is requesting refill(s) lipitor  Last OV 3/8/22 (pertaining to medication)  LR 11/22/21 (per medication requested)  Next office visit scheduled or attempted Yes   If no, reason:  11/23/22

## 2022-11-22 SDOH — HEALTH STABILITY: PHYSICAL HEALTH: ON AVERAGE, HOW MANY DAYS PER WEEK DO YOU ENGAGE IN MODERATE TO STRENUOUS EXERCISE (LIKE A BRISK WALK)?: 5 DAYS

## 2022-11-22 SDOH — HEALTH STABILITY: PHYSICAL HEALTH: ON AVERAGE, HOW MANY MINUTES DO YOU ENGAGE IN EXERCISE AT THIS LEVEL?: 30 MIN

## 2022-11-22 ASSESSMENT — SOCIAL DETERMINANTS OF HEALTH (SDOH)
WITHIN THE LAST YEAR, HAVE YOU BEEN AFRAID OF YOUR PARTNER OR EX-PARTNER?: NO
WITHIN THE LAST YEAR, HAVE YOU BEEN KICKED, HIT, SLAPPED, OR OTHERWISE PHYSICALLY HURT BY YOUR PARTNER OR EX-PARTNER?: NO
WITHIN THE LAST YEAR, HAVE YOU BEEN HUMILIATED OR EMOTIONALLY ABUSED IN OTHER WAYS BY YOUR PARTNER OR EX-PARTNER?: NO
WITHIN THE LAST YEAR, HAVE TO BEEN RAPED OR FORCED TO HAVE ANY KIND OF SEXUAL ACTIVITY BY YOUR PARTNER OR EX-PARTNER?: NO

## 2022-11-23 ENCOUNTER — OFFICE VISIT (OUTPATIENT)
Dept: FAMILY MEDICINE CLINIC | Age: 67
End: 2022-11-23
Payer: COMMERCIAL

## 2022-11-23 VITALS
HEART RATE: 70 BPM | DIASTOLIC BLOOD PRESSURE: 70 MMHG | BODY MASS INDEX: 24.27 KG/M2 | SYSTOLIC BLOOD PRESSURE: 122 MMHG | HEIGHT: 72 IN | OXYGEN SATURATION: 98 % | RESPIRATION RATE: 16 BRPM | TEMPERATURE: 96.5 F | WEIGHT: 179.2 LBS

## 2022-11-23 DIAGNOSIS — Z98.890 S/P MVR (MITRAL VALVE REPAIR): ICD-10-CM

## 2022-11-23 DIAGNOSIS — N40.0 ENLARGED PROSTATE: ICD-10-CM

## 2022-11-23 DIAGNOSIS — E78.2 MIXED HYPERLIPIDEMIA: Primary | ICD-10-CM

## 2022-11-23 DIAGNOSIS — Z23 NEED FOR INFLUENZA VACCINATION: ICD-10-CM

## 2022-11-23 DIAGNOSIS — M50.30 DDD (DEGENERATIVE DISC DISEASE), CERVICAL: ICD-10-CM

## 2022-11-23 LAB
A/G RATIO: 2 (ref 1.1–2.2)
ALBUMIN SERPL-MCNC: 4.1 G/DL (ref 3.4–5)
ALP BLD-CCNC: 76 U/L (ref 40–129)
ALT SERPL-CCNC: 16 U/L (ref 10–40)
ANION GAP SERPL CALCULATED.3IONS-SCNC: 12 MMOL/L (ref 3–16)
AST SERPL-CCNC: 15 U/L (ref 15–37)
BILIRUB SERPL-MCNC: 0.6 MG/DL (ref 0–1)
BUN BLDV-MCNC: 18 MG/DL (ref 7–20)
CALCIUM SERPL-MCNC: 9.3 MG/DL (ref 8.3–10.6)
CHLORIDE BLD-SCNC: 107 MMOL/L (ref 99–110)
CHOLESTEROL, TOTAL: 136 MG/DL (ref 0–199)
CO2: 25 MMOL/L (ref 21–32)
CREAT SERPL-MCNC: 0.8 MG/DL (ref 0.8–1.3)
GFR SERPL CREATININE-BSD FRML MDRD: >60 ML/MIN/{1.73_M2}
GLUCOSE BLD-MCNC: 86 MG/DL (ref 70–99)
HDLC SERPL-MCNC: 60 MG/DL (ref 40–60)
LDL CHOLESTEROL CALCULATED: 61 MG/DL
POTASSIUM SERPL-SCNC: 4.3 MMOL/L (ref 3.5–5.1)
SODIUM BLD-SCNC: 144 MMOL/L (ref 136–145)
TOTAL PROTEIN: 6.2 G/DL (ref 6.4–8.2)
TRIGL SERPL-MCNC: 76 MG/DL (ref 0–150)
VLDLC SERPL CALC-MCNC: 15 MG/DL

## 2022-11-23 PROCEDURE — 1123F ACP DISCUSS/DSCN MKR DOCD: CPT | Performed by: PHYSICIAN ASSISTANT

## 2022-11-23 PROCEDURE — 90694 VACC AIIV4 NO PRSRV 0.5ML IM: CPT | Performed by: PHYSICIAN ASSISTANT

## 2022-11-23 PROCEDURE — 90471 IMMUNIZATION ADMIN: CPT | Performed by: PHYSICIAN ASSISTANT

## 2022-11-23 PROCEDURE — 99214 OFFICE O/P EST MOD 30 MIN: CPT | Performed by: PHYSICIAN ASSISTANT

## 2022-11-23 RX ORDER — ATORVASTATIN CALCIUM 20 MG/1
TABLET, FILM COATED ORAL
Qty: 90 TABLET | Refills: 3 | Status: SHIPPED | OUTPATIENT
Start: 2022-11-23

## 2022-11-23 SDOH — ECONOMIC STABILITY: FOOD INSECURITY: WITHIN THE PAST 12 MONTHS, THE FOOD YOU BOUGHT JUST DIDN'T LAST AND YOU DIDN'T HAVE MONEY TO GET MORE.: NEVER TRUE

## 2022-11-23 SDOH — ECONOMIC STABILITY: FOOD INSECURITY: WITHIN THE PAST 12 MONTHS, YOU WORRIED THAT YOUR FOOD WOULD RUN OUT BEFORE YOU GOT MONEY TO BUY MORE.: NEVER TRUE

## 2022-11-23 ASSESSMENT — PATIENT HEALTH QUESTIONNAIRE - PHQ9
3. TROUBLE FALLING OR STAYING ASLEEP: 0
4. FEELING TIRED OR HAVING LITTLE ENERGY: 0
SUM OF ALL RESPONSES TO PHQ QUESTIONS 1-9: 0
5. POOR APPETITE OR OVEREATING: 0
1. LITTLE INTEREST OR PLEASURE IN DOING THINGS: 0
SUM OF ALL RESPONSES TO PHQ QUESTIONS 1-9: 0
10. IF YOU CHECKED OFF ANY PROBLEMS, HOW DIFFICULT HAVE THESE PROBLEMS MADE IT FOR YOU TO DO YOUR WORK, TAKE CARE OF THINGS AT HOME, OR GET ALONG WITH OTHER PEOPLE: 0
SUM OF ALL RESPONSES TO PHQ QUESTIONS 1-9: 0
9. THOUGHTS THAT YOU WOULD BE BETTER OFF DEAD, OR OF HURTING YOURSELF: 0
7. TROUBLE CONCENTRATING ON THINGS, SUCH AS READING THE NEWSPAPER OR WATCHING TELEVISION: 0
2. FEELING DOWN, DEPRESSED OR HOPELESS: 0
SUM OF ALL RESPONSES TO PHQ9 QUESTIONS 1 & 2: 0
8. MOVING OR SPEAKING SO SLOWLY THAT OTHER PEOPLE COULD HAVE NOTICED. OR THE OPPOSITE, BEING SO FIGETY OR RESTLESS THAT YOU HAVE BEEN MOVING AROUND A LOT MORE THAN USUAL: 0
SUM OF ALL RESPONSES TO PHQ QUESTIONS 1-9: 0
6. FEELING BAD ABOUT YOURSELF - OR THAT YOU ARE A FAILURE OR HAVE LET YOURSELF OR YOUR FAMILY DOWN: 0

## 2022-11-23 ASSESSMENT — SOCIAL DETERMINANTS OF HEALTH (SDOH): HOW HARD IS IT FOR YOU TO PAY FOR THE VERY BASICS LIKE FOOD, HOUSING, MEDICAL CARE, AND HEATING?: NOT HARD AT ALL

## 2022-11-23 NOTE — PROGRESS NOTES
[address] MEDICINE  11/23/22       IMPRESSION/ PLAN   Deneen Alonso was seen today for established new doctor     Mixed hyperlipidemia  -     atorvastatin (LIPITOR) 20 MG tablet; TAKE 1 TABLET BY MOUTH EVERY NIGHT FOR HIGH CHOLESTEROL  -     Lipid Panel  -     Comprehensive Metabolic Panel    S/P MVR (mitral valve repair)  -     followed by [facility]. TTE unchanged    Enlarged prostate  - Urology appointment next month who does PSAs     DDD (degenerative disc disease), cervical  - stable,  no exacerbations    HEALTH MAINTENANCE:   -     Influenza, FLUAD, (age 72 y+), IM, Preservative Free, 0.5 mL       -  have COVID booster    Follow Up every 6 mo      [facility]  Chief Complaint   Patient presents with    Established New Doctor     Patient is here to establish with Maddie Peers, was previously seeing     Annual Exam     He is fasting for blood work     HISTORY OF PRESENT  ILLNESS  Bo Osuna is a 79 y.o.  male   NEW to provider  HLD- on Lipitor without myalgias or abdominal pains. Non rheumatic Mitral Valve prolapse, regurg,  with surgical repair with annular ring prosthesis. Last TTE with [facility], Dr Mirta Zelaya 12/20/21. DDD cervical spine and Foraminal stenosis- sees [facility]. Was aggravated last year and since improved. Right CTrepair has improved issues. Enlarged prostate- sees urology again next month. No new urinary symptoms . They check his PSA levels. ROS:  Remaining 14 ROS were reviewed and are unremarkable for other constitutional, EENT, cardiac, pulmonary, GI, , neurologic, musculoskeletal, or integumentary complaints. PAST MEDICAL/SURGICAL, SOCIAL, &  FAMILY HISTORY:  Reviewed and updated accordingly. ALLERGIES : Patient has no known allergies.     MEDICATIONS:  Current Outpatient Medications   Medication Sig Dispense Refill    atorvastatin (LIPITOR) 20 MG tablet TAKE 1 TABLET BY MOUTH EVERY NIGHT FOR HIGH CHOLESTEROL 90 tablet 3    aspirin 81 MG tablet Take 81 mg by mouth daily. Multiple Vitamins-Minerals (THERAPEUTIC MULTIVITAMIN-MINERALS) tablet Take 1 tablet by mouth daily. No current facility-administered medications for this visit. PHYSICAL EXAM     Vitals:    11/23/22 0809   BP: 122/70   Pulse: 70   Resp: 16   Temp: (!) 96.5 °F (35.8 °C)   TempSrc: Temporal   SpO2: 98%   Weight: 179 lb 3.2 oz (81.3 kg)   Height: 6' (1.829 m)   Body mass index is 24.3 kg/m². APPEARANCE: Well nourished. No distress. HEENT: Head: Normocephalic, atraumatic. EOMI,PERRLA. Conjunctiva pink and moist. Sclera white. Hearing intact. Nares patent. Oral mucosa moist. Throat clear. NECK: No lymphadenopathy or masses. Thyroid is smooth. Moves neck fully. HEART: Reg rate and rhythm. No murmurs, rubs, or gallops. LUNGS: Clear to auscultation. No wheezes, rales, or rhonchi. ABDOMEN:  Soft, bowel sounds present, non-tender, no masses or organomegaly. MUSCULOSKELETAL:  No clubbing, cyanosis or edema. Moves all joints without eliciting pain. NEUROLOGIC: Grossly non focal.   SKIN: Warm, dry, normal turgor. Cap refill <3secs. No rashes, petechiae, purpura. PSYCHIATRIC:  Mood, behavior, and judgement normal. Thought content normal.      ADDITIONAL STUDIES     Pertinent prior laboratory results and/or imaging reviewed.    Orders Placed This Encounter   Procedures    Influenza, FLUAD, (age 72 y+), IM, Preservative Free, 0.5 mL    Lipid Panel    Comprehensive Metabolic Panel       Electronically signed by Leda Mcardle, PA on 11/23/2022 at 8:28 AM

## 2022-11-23 NOTE — PROGRESS NOTES
Vaccine Information Sheet, \"Influenza - Inactivated\"  given to Meghna Marcano, or parent/legal guardian of  Meghna Marcano and verbalized understanding. Patient responses:    Have you ever had a reaction to a flu vaccine? No  Do you have any current illness? No  Have you ever had Guillian Earle Syndrome? No  Do you have a serious allergy to any of the follow: Neomycin, Polymyxin, Thimerosal, eggs or egg products? No    Flu vaccine given per order. Please see immunization tab. Risks and benefits explained. Current VIS given.

## 2023-01-05 DIAGNOSIS — E78.2 MIXED HYPERLIPIDEMIA: ICD-10-CM

## 2023-01-05 RX ORDER — ATORVASTATIN CALCIUM 20 MG/1
TABLET, FILM COATED ORAL
Qty: 30 TABLET | OUTPATIENT
Start: 2023-01-05

## 2023-05-22 SDOH — ECONOMIC STABILITY: TRANSPORTATION INSECURITY
IN THE PAST 12 MONTHS, HAS LACK OF TRANSPORTATION KEPT YOU FROM MEETINGS, WORK, OR FROM GETTING THINGS NEEDED FOR DAILY LIVING?: NO

## 2023-05-22 SDOH — ECONOMIC STABILITY: HOUSING INSECURITY
IN THE LAST 12 MONTHS, WAS THERE A TIME WHEN YOU DID NOT HAVE A STEADY PLACE TO SLEEP OR SLEPT IN A SHELTER (INCLUDING NOW)?: NO

## 2023-05-22 SDOH — ECONOMIC STABILITY: FOOD INSECURITY: WITHIN THE PAST 12 MONTHS, THE FOOD YOU BOUGHT JUST DIDN'T LAST AND YOU DIDN'T HAVE MONEY TO GET MORE.: NEVER TRUE

## 2023-05-22 SDOH — ECONOMIC STABILITY: FOOD INSECURITY: WITHIN THE PAST 12 MONTHS, YOU WORRIED THAT YOUR FOOD WOULD RUN OUT BEFORE YOU GOT MONEY TO BUY MORE.: NEVER TRUE

## 2023-05-22 SDOH — ECONOMIC STABILITY: INCOME INSECURITY: HOW HARD IS IT FOR YOU TO PAY FOR THE VERY BASICS LIKE FOOD, HOUSING, MEDICAL CARE, AND HEATING?: NOT VERY HARD

## 2023-05-25 ENCOUNTER — OFFICE VISIT (OUTPATIENT)
Dept: FAMILY MEDICINE CLINIC | Age: 68
End: 2023-05-25
Payer: COMMERCIAL

## 2023-05-25 VITALS
TEMPERATURE: 97.3 F | WEIGHT: 183.4 LBS | HEART RATE: 71 BPM | DIASTOLIC BLOOD PRESSURE: 56 MMHG | BODY MASS INDEX: 24.84 KG/M2 | HEIGHT: 72 IN | RESPIRATION RATE: 16 BRPM | SYSTOLIC BLOOD PRESSURE: 120 MMHG | OXYGEN SATURATION: 97 %

## 2023-05-25 DIAGNOSIS — M50.30 DDD (DEGENERATIVE DISC DISEASE), CERVICAL: ICD-10-CM

## 2023-05-25 DIAGNOSIS — E78.2 MIXED HYPERLIPIDEMIA: Primary | ICD-10-CM

## 2023-05-25 DIAGNOSIS — N40.0 ENLARGED PROSTATE: ICD-10-CM

## 2023-05-25 DIAGNOSIS — Z98.890 S/P MVR (MITRAL VALVE REPAIR): ICD-10-CM

## 2023-05-25 PROCEDURE — 1123F ACP DISCUSS/DSCN MKR DOCD: CPT | Performed by: PHYSICIAN ASSISTANT

## 2023-05-25 PROCEDURE — 99214 OFFICE O/P EST MOD 30 MIN: CPT | Performed by: PHYSICIAN ASSISTANT

## 2023-05-25 RX ORDER — ATORVASTATIN CALCIUM 20 MG/1
TABLET, FILM COATED ORAL
Qty: 90 TABLET | Refills: 3 | Status: SHIPPED | OUTPATIENT
Start: 2023-05-25

## 2023-05-25 RX ORDER — TADALAFIL 5 MG/1
5 TABLET ORAL DAILY
COMMUNITY
Start: 2023-05-10

## 2023-05-25 ASSESSMENT — PATIENT HEALTH QUESTIONNAIRE - PHQ9
3. TROUBLE FALLING OR STAYING ASLEEP: 0
SUM OF ALL RESPONSES TO PHQ9 QUESTIONS 1 & 2: 0
SUM OF ALL RESPONSES TO PHQ QUESTIONS 1-9: 0
8. MOVING OR SPEAKING SO SLOWLY THAT OTHER PEOPLE COULD HAVE NOTICED. OR THE OPPOSITE, BEING SO FIGETY OR RESTLESS THAT YOU HAVE BEEN MOVING AROUND A LOT MORE THAN USUAL: 0
7. TROUBLE CONCENTRATING ON THINGS, SUCH AS READING THE NEWSPAPER OR WATCHING TELEVISION: 0
10. IF YOU CHECKED OFF ANY PROBLEMS, HOW DIFFICULT HAVE THESE PROBLEMS MADE IT FOR YOU TO DO YOUR WORK, TAKE CARE OF THINGS AT HOME, OR GET ALONG WITH OTHER PEOPLE: 0
SUM OF ALL RESPONSES TO PHQ QUESTIONS 1-9: 0
6. FEELING BAD ABOUT YOURSELF - OR THAT YOU ARE A FAILURE OR HAVE LET YOURSELF OR YOUR FAMILY DOWN: 0
9. THOUGHTS THAT YOU WOULD BE BETTER OFF DEAD, OR OF HURTING YOURSELF: 0
SUM OF ALL RESPONSES TO PHQ QUESTIONS 1-9: 0
1. LITTLE INTEREST OR PLEASURE IN DOING THINGS: 0
5. POOR APPETITE OR OVEREATING: 0
4. FEELING TIRED OR HAVING LITTLE ENERGY: 0
SUM OF ALL RESPONSES TO PHQ QUESTIONS 1-9: 0
2. FEELING DOWN, DEPRESSED OR HOPELESS: 0

## 2023-05-31 ENCOUNTER — NURSE ONLY (OUTPATIENT)
Dept: FAMILY MEDICINE CLINIC | Age: 68
End: 2023-05-31
Payer: COMMERCIAL

## 2023-05-31 DIAGNOSIS — E78.2 MIXED HYPERLIPIDEMIA: ICD-10-CM

## 2023-05-31 DIAGNOSIS — Z98.890 S/P MVR (MITRAL VALVE REPAIR): ICD-10-CM

## 2023-05-31 LAB
ALBUMIN SERPL-MCNC: 4.4 G/DL (ref 3.4–5)
ALBUMIN/GLOB SERPL: 2.2 {RATIO} (ref 1.1–2.2)
ALP SERPL-CCNC: 65 U/L (ref 40–129)
ALT SERPL-CCNC: 18 U/L (ref 10–40)
ANION GAP SERPL CALCULATED.3IONS-SCNC: 12 MMOL/L (ref 3–16)
AST SERPL-CCNC: 17 U/L (ref 15–37)
BILIRUB SERPL-MCNC: 0.7 MG/DL (ref 0–1)
BUN SERPL-MCNC: 21 MG/DL (ref 7–20)
CALCIUM SERPL-MCNC: 9.1 MG/DL (ref 8.3–10.6)
CHLORIDE SERPL-SCNC: 106 MMOL/L (ref 99–110)
CHOLEST SERPL-MCNC: 134 MG/DL (ref 0–199)
CO2 SERPL-SCNC: 24 MMOL/L (ref 21–32)
CREAT SERPL-MCNC: 0.9 MG/DL (ref 0.8–1.3)
GFR SERPLBLD CREATININE-BSD FMLA CKD-EPI: >60 ML/MIN/{1.73_M2}
GLUCOSE SERPL-MCNC: 81 MG/DL (ref 70–99)
HDLC SERPL-MCNC: 58 MG/DL (ref 40–60)
LDLC SERPL CALC-MCNC: 60 MG/DL
POTASSIUM SERPL-SCNC: 4.4 MMOL/L (ref 3.5–5.1)
PROT SERPL-MCNC: 6.4 G/DL (ref 6.4–8.2)
SODIUM SERPL-SCNC: 142 MMOL/L (ref 136–145)
TRIGL SERPL-MCNC: 78 MG/DL (ref 0–150)
VLDLC SERPL CALC-MCNC: 16 MG/DL

## 2023-05-31 PROCEDURE — 36415 COLL VENOUS BLD VENIPUNCTURE: CPT | Performed by: PHYSICIAN ASSISTANT

## 2023-11-27 NOTE — PROGRESS NOTES
[address]  [facility] MEDICINE  11/30/23       IMPRESSION/ PLAN     1. Mixed hyperlipidemia    2. S/P MVR (mitral valve repair)    3. Enlarged prostate    4. DDD (degenerative disc disease), cervical        Mixed hyperlipidemia  -   continue  atorvastatin (LIPITOR) 20 MG tablet;  qhs.   -Check labs and adjust treatment as needed. -   exercises by walking 3 miles per day. Monitors diet. S/P MVR (mitral valve repair),    -     followed by [facility]. TTE unchanged . Follow up yearly. - labs done in our office. Enlarged prostate  - Urology  yearly 12/2023 , PSA and DRR. DDD (degenerative disc disease), cervical  - stable,  no exacerbations     HEALTH MAINTENANCE:-   UTD on flu and covid vax. Discussed and recommended RSV. Routine follow up 6 mo        [address]  Chief Complaint   Patient presents with    Hyperlipidemia     Pt states that he is here for a 11 month f/u     HISTORY OF PRESENT  ILLNESS  Deb Case is a 76 y.o.  male   6 month routine follow up. HLD- on Lipitor without myalgias or abdominal pains. Non rheumatic Mitral Valve prolapse, regurg,  with MV repair 2006. Last TTE with [facility], Dr Maria Fernanda Lindsey 3/2023. no changes. Enlarged prostate-  urology 1/2023 [facility] at 45. Not on flomax follows up yearly for MARIBELL and PSA. DDD cervical spine and Foraminal stenosis- sees [facility]. Was aggravated last year and since improved. Surgery improved issues. ROS:  Remaining 14 ROS were reviewed and are unremarkable for other constitutional, EENT, cardiac, pulmonary, GI, , neurologic, musculoskeletal, or integumentary complaints. PAST MEDICAL/SURGICAL, SOCIAL, &  FAMILY HISTORY:  Reviewed and updated accordingly. ALLERGIES : Patient has no known allergies.     MEDICATIONS:  Current Outpatient Medications on File Prior to Visit   Medication Sig Dispense Refill    tadalafil (CIALIS) 5 MG tablet Take 1 tablet by mouth daily      atorvastatin (LIPITOR) 20 MG tablet

## 2023-11-30 ENCOUNTER — OFFICE VISIT (OUTPATIENT)
Dept: FAMILY MEDICINE CLINIC | Age: 68
End: 2023-11-30
Payer: COMMERCIAL

## 2023-11-30 VITALS
OXYGEN SATURATION: 98 % | DIASTOLIC BLOOD PRESSURE: 60 MMHG | BODY MASS INDEX: 24.52 KG/M2 | WEIGHT: 180.8 LBS | SYSTOLIC BLOOD PRESSURE: 100 MMHG | RESPIRATION RATE: 16 BRPM | HEART RATE: 71 BPM | TEMPERATURE: 97.7 F

## 2023-11-30 DIAGNOSIS — Z98.890 S/P MVR (MITRAL VALVE REPAIR): ICD-10-CM

## 2023-11-30 DIAGNOSIS — N40.0 ENLARGED PROSTATE: ICD-10-CM

## 2023-11-30 DIAGNOSIS — E78.2 MIXED HYPERLIPIDEMIA: Primary | ICD-10-CM

## 2023-11-30 DIAGNOSIS — M50.30 DDD (DEGENERATIVE DISC DISEASE), CERVICAL: ICD-10-CM

## 2023-11-30 LAB
ALBUMIN SERPL-MCNC: 4.4 G/DL (ref 3.4–5)
ALBUMIN/GLOB SERPL: 2 {RATIO} (ref 1.1–2.2)
ALP SERPL-CCNC: 65 U/L (ref 40–129)
ALT SERPL-CCNC: 24 U/L (ref 10–40)
ANION GAP SERPL CALCULATED.3IONS-SCNC: 10 MMOL/L (ref 3–16)
AST SERPL-CCNC: 18 U/L (ref 15–37)
BILIRUB SERPL-MCNC: 0.8 MG/DL (ref 0–1)
BUN SERPL-MCNC: 19 MG/DL (ref 7–20)
CALCIUM SERPL-MCNC: 9.4 MG/DL (ref 8.3–10.6)
CHLORIDE SERPL-SCNC: 104 MMOL/L (ref 99–110)
CHOLEST SERPL-MCNC: 138 MG/DL (ref 0–199)
CO2 SERPL-SCNC: 28 MMOL/L (ref 21–32)
CREAT SERPL-MCNC: 0.9 MG/DL (ref 0.8–1.3)
GFR SERPLBLD CREATININE-BSD FMLA CKD-EPI: >60 ML/MIN/{1.73_M2}
GLUCOSE SERPL-MCNC: 77 MG/DL (ref 70–99)
HDLC SERPL-MCNC: 61 MG/DL (ref 40–60)
LDLC SERPL CALC-MCNC: 62 MG/DL
POTASSIUM SERPL-SCNC: 5 MMOL/L (ref 3.5–5.1)
PROT SERPL-MCNC: 6.6 G/DL (ref 6.4–8.2)
SODIUM SERPL-SCNC: 142 MMOL/L (ref 136–145)
TRIGL SERPL-MCNC: 77 MG/DL (ref 0–150)
VLDLC SERPL CALC-MCNC: 15 MG/DL

## 2023-11-30 PROCEDURE — 1123F ACP DISCUSS/DSCN MKR DOCD: CPT | Performed by: PHYSICIAN ASSISTANT

## 2023-11-30 PROCEDURE — 99214 OFFICE O/P EST MOD 30 MIN: CPT | Performed by: PHYSICIAN ASSISTANT

## 2023-12-02 DIAGNOSIS — E78.2 MIXED HYPERLIPIDEMIA: ICD-10-CM

## 2023-12-04 RX ORDER — ATORVASTATIN CALCIUM 20 MG/1
TABLET, FILM COATED ORAL
Qty: 90 TABLET | Refills: 3 | Status: SHIPPED | OUTPATIENT
Start: 2023-12-04

## 2023-12-04 NOTE — TELEPHONE ENCOUNTER
Daysi Samuel is requesting refill(s) lipitor  Last OV 11/30/23 (pertaining to medication)  LR 5/25/23 (per medication requested)  Next office visit scheduled or attempted Yes   If no, reason:  5/29/24

## 2024-02-09 ENCOUNTER — TELEPHONE (OUTPATIENT)
Dept: FAMILY MEDICINE CLINIC | Age: 69
End: 2024-02-09

## 2024-02-09 NOTE — TELEPHONE ENCOUNTER
----- Message from Mratha Gardner sent at 2/9/2024  3:37 PM EST -----  Subject: Appointment Request    Reason for Call: Established Patient Appointment needed: Routine Pre-Op    QUESTIONS    Reason for appointment request? No appointments available during search     Additional Information for Provider? date? 3/21/24 type? prostate surgeon?   Dr. Cordon [facility]? unknown  ---------------------------------------------------------------------------  --------------  CALL BACK INFO  [phone]; OK to leave message on voicemail  ---------------------------------------------------------------------------  --------------  SCRIPT ANSWERS

## 2024-03-07 ENCOUNTER — OFFICE VISIT (OUTPATIENT)
Dept: FAMILY MEDICINE CLINIC | Age: 69
End: 2024-03-07
Payer: COMMERCIAL

## 2024-03-07 VITALS
RESPIRATION RATE: 16 BRPM | WEIGHT: 184 LBS | DIASTOLIC BLOOD PRESSURE: 74 MMHG | SYSTOLIC BLOOD PRESSURE: 120 MMHG | OXYGEN SATURATION: 97 % | HEART RATE: 59 BPM | HEIGHT: 72 IN | BODY MASS INDEX: 24.92 KG/M2

## 2024-03-07 DIAGNOSIS — R39.9 URINARY SYMPTOM OR SIGN: ICD-10-CM

## 2024-03-07 DIAGNOSIS — E78.2 MIXED HYPERLIPIDEMIA: ICD-10-CM

## 2024-03-07 DIAGNOSIS — Z01.818 PREOP EXAMINATION: Primary | ICD-10-CM

## 2024-03-07 DIAGNOSIS — Z98.890 S/P MVR (MITRAL VALVE REPAIR): ICD-10-CM

## 2024-03-07 DIAGNOSIS — N40.0 ENLARGED PROSTATE: ICD-10-CM

## 2024-03-07 PROCEDURE — 99214 OFFICE O/P EST MOD 30 MIN: CPT | Performed by: PHYSICIAN ASSISTANT

## 2024-03-07 PROCEDURE — 1123F ACP DISCUSS/DSCN MKR DOCD: CPT | Performed by: PHYSICIAN ASSISTANT

## 2024-03-07 ASSESSMENT — PATIENT HEALTH QUESTIONNAIRE - PHQ9
SUM OF ALL RESPONSES TO PHQ QUESTIONS 1-9: 0
2. FEELING DOWN, DEPRESSED OR HOPELESS: 0
SUM OF ALL RESPONSES TO PHQ9 QUESTIONS 1 & 2: 0
1. LITTLE INTEREST OR PLEASURE IN DOING THINGS: 0
SUM OF ALL RESPONSES TO PHQ QUESTIONS 1-9: 0

## 2024-05-28 NOTE — PROGRESS NOTES
[facility]  05/28/24       IMPRESSION/ PLAN     Mixed hyperlipidemia  -   continue  atorvastatin (LIPITOR) 20 MG tablet;  qhs.   -  exercises by walking 3 miles per day. Monitors diet.      S/P MVR (mitral valve repair),    -     followed by dr Saenz at [facility] January.  Repeat TTE 12/24, done q 3 years.      Enlarged prostate s/p exploratory in 4/2024.  Will need TURP but waiting until after wife back sx.     DDD (degenerative disc disease), cervical  -  minimal issues. No exacerbations     HEALTH MAINTENANCE:  Prevnar vax today. Have flu and RSV in fall.    Routine follow up 6 mo. Fasting labs in Nov.      CHIEF COMPLAINT  No chief complaint on file.    HISTORY OF PRESENT  ILLNESS  Akshat Feliz is a 69 y.o.  male   6 month routine follow up.  HLD- on Lipitor without myalgias or abdominal pains. Walks 2 miles at lunch and works around the house.   Non rheumatic Mitral Valve prolapse, regurg thus with MV repair 2006. Repeat TTE q 3 years. Next one due 12/2024. [facility],   Followed  by Dr Amanda.   Enlarged prostate-  exploratory sx 4/2024 w Dr Cordon. Will need TURP.  Will wait until after his wife's back surgery this summer.  DDD cervical spine and Foraminal stenosis-  Surgery improved issues.  No new issues       ROS:  Remaining 14 ROS were reviewed and are unremarkable for other constitutional, EENT, cardiac, pulmonary, GI, , neurologic, musculoskeletal, or integumentary complaints.      PAST MEDICAL/SURGICAL, SOCIAL, &  FAMILY HISTORY:  Reviewed and updated accordingly.     ALLERGIES : Patient has no known allergies.    MEDICATIONS:/  Current Outpatient Medications on File Prior to Visit   Medication Sig Dispense Refill    atorvastatin (LIPITOR) 20 MG tablet TAKE 1 TABLET BY MOUTH EVERY NIGHT FOR HIGH CHOLESTEROL 90 tablet 3    aspirin 81 MG tablet Take 1 tablet by mouth daily      Multiple Vitamins-Minerals (THERAPEUTIC MULTIVITAMIN-MINERALS) tablet Take 1 tablet by

## 2024-05-29 ENCOUNTER — OFFICE VISIT (OUTPATIENT)
Dept: FAMILY MEDICINE CLINIC | Age: 69
End: 2024-05-29
Payer: COMMERCIAL

## 2024-05-29 VITALS
OXYGEN SATURATION: 99 % | HEART RATE: 62 BPM | HEIGHT: 72 IN | RESPIRATION RATE: 16 BRPM | WEIGHT: 180.6 LBS | SYSTOLIC BLOOD PRESSURE: 118 MMHG | BODY MASS INDEX: 24.46 KG/M2 | TEMPERATURE: 97.7 F | DIASTOLIC BLOOD PRESSURE: 62 MMHG

## 2024-05-29 DIAGNOSIS — N40.0 ENLARGED PROSTATE: ICD-10-CM

## 2024-05-29 DIAGNOSIS — Z98.890 S/P MVR (MITRAL VALVE REPAIR): Primary | ICD-10-CM

## 2024-05-29 DIAGNOSIS — M50.30 DDD (DEGENERATIVE DISC DISEASE), CERVICAL: ICD-10-CM

## 2024-05-29 DIAGNOSIS — Z23 NEED FOR PNEUMOCOCCAL 20-VALENT CONJUGATE VACCINATION: ICD-10-CM

## 2024-05-29 DIAGNOSIS — E78.2 MIXED HYPERLIPIDEMIA: ICD-10-CM

## 2024-05-29 PROCEDURE — 99214 OFFICE O/P EST MOD 30 MIN: CPT | Performed by: PHYSICIAN ASSISTANT

## 2024-05-29 PROCEDURE — 1123F ACP DISCUSS/DSCN MKR DOCD: CPT | Performed by: PHYSICIAN ASSISTANT

## 2024-05-29 PROCEDURE — 90471 IMMUNIZATION ADMIN: CPT | Performed by: PHYSICIAN ASSISTANT

## 2024-05-29 PROCEDURE — 90677 PCV20 VACCINE IM: CPT | Performed by: PHYSICIAN ASSISTANT

## 2024-05-29 RX ORDER — ATORVASTATIN CALCIUM 20 MG/1
20 TABLET, FILM COATED ORAL DAILY
Qty: 90 TABLET | Refills: 3 | Status: SHIPPED | OUTPATIENT
Start: 2024-05-29

## 2024-05-29 SDOH — ECONOMIC STABILITY: FOOD INSECURITY: WITHIN THE PAST 12 MONTHS, YOU WORRIED THAT YOUR FOOD WOULD RUN OUT BEFORE YOU GOT MONEY TO BUY MORE.: NEVER TRUE

## 2024-05-29 SDOH — ECONOMIC STABILITY: INCOME INSECURITY: HOW HARD IS IT FOR YOU TO PAY FOR THE VERY BASICS LIKE FOOD, HOUSING, MEDICAL CARE, AND HEATING?: NOT HARD AT ALL

## 2024-05-29 SDOH — ECONOMIC STABILITY: FOOD INSECURITY: WITHIN THE PAST 12 MONTHS, THE FOOD YOU BOUGHT JUST DIDN'T LAST AND YOU DIDN'T HAVE MONEY TO GET MORE.: NEVER TRUE

## 2024-05-29 ASSESSMENT — PATIENT HEALTH QUESTIONNAIRE - PHQ9
1. LITTLE INTEREST OR PLEASURE IN DOING THINGS: NOT AT ALL
2. FEELING DOWN, DEPRESSED OR HOPELESS: NOT AT ALL
4. FEELING TIRED OR HAVING LITTLE ENERGY: NOT AT ALL
SUM OF ALL RESPONSES TO PHQ QUESTIONS 1-9: 0
9. THOUGHTS THAT YOU WOULD BE BETTER OFF DEAD, OR OF HURTING YOURSELF: NOT AT ALL
5. POOR APPETITE OR OVEREATING: NOT AT ALL
6. FEELING BAD ABOUT YOURSELF - OR THAT YOU ARE A FAILURE OR HAVE LET YOURSELF OR YOUR FAMILY DOWN: NOT AT ALL
3. TROUBLE FALLING OR STAYING ASLEEP: NOT AT ALL
7. TROUBLE CONCENTRATING ON THINGS, SUCH AS READING THE NEWSPAPER OR WATCHING TELEVISION: NOT AT ALL
8. MOVING OR SPEAKING SO SLOWLY THAT OTHER PEOPLE COULD HAVE NOTICED. OR THE OPPOSITE, BEING SO FIGETY OR RESTLESS THAT YOU HAVE BEEN MOVING AROUND A LOT MORE THAN USUAL: NOT AT ALL
SUM OF ALL RESPONSES TO PHQ QUESTIONS 1-9: 0
10. IF YOU CHECKED OFF ANY PROBLEMS, HOW DIFFICULT HAVE THESE PROBLEMS MADE IT FOR YOU TO DO YOUR WORK, TAKE CARE OF THINGS AT HOME, OR GET ALONG WITH OTHER PEOPLE: NOT DIFFICULT AT ALL
SUM OF ALL RESPONSES TO PHQ9 QUESTIONS 1 & 2: 0

## 2024-05-29 NOTE — PATIENT INSTRUCTIONS
Healthy Living Recommendations 2024:  Exercise 150 minutes/ week: Aerobic and Weights Aerobic exercise strengthens muscle while weights and resistance strengthens bone. Body Mass Index (BMI) goal is 25.     Nutrition: Avoid salting foods. Limit caffeine to less than 3 cups caffeine/day. Limit carbohydrates like breads, rice, and pastas. Avoid processed and fried foods. Eat baked or broiled foods. One can never have too many vegetables and fruits which are good fiber source. Fiber lowers glucose levels. Studies show diets high in red meat and processed foods WILL increase the risk heart and liver diseases, cancers, and dementia.   Sugar : Female: Maximum sugar/ day is 6 tsp or 24 grams/ day               Male: Maximum sugar/ day is 9 tsp or 28 grams/ day  Protein:  used to protect immune system, regulate hormones,and build muscle.   High protein foods: [place], Greek yogurt, chicken, Lentils, Eggs  Calcium/ Vit D3 intake helps bones, digestion, kidneys, and mental health.   High calcium foods:  milk, yogurt, cheese, beans, dark green leafy vegetables.   High Vitamin D foods:  salmon, tuna fish, fortified cereals, mushrooms.  Tobacco: Avoid all smoking     Eye exam every 2 years after age 40.   Dental; Brush twice a day. Floss daily. Dentist exam every 6 months.  Noise: recurrent loud noise WILL result in hearing loss.   Earbud use: keep volume below 50%. Just 5 minutes at 100% volume will result in permanent hearing loss.   Colonoscopy Begin at age 45.    Skin: examine skin monthly for changes.  Breasts: Perform monthly self-breast exam for changes. FM: Yearly mammograms begin age 40. Repeat yearly.  Males   perform monthly self-testicular exam for changes.  Urinary changes can be a sign of prostate issues.         [facility]   [address], [address], [place] [address]  Office hours: Monday - Friday 7 am- 5 pm. Phone lines turn on at 8 am.   Office PH: [phone], FAX [phone]

## 2024-11-01 PROBLEM — M50.30 DDD (DEGENERATIVE DISC DISEASE), CERVICAL: Status: ACTIVE | Noted: 2024-11-01

## 2024-11-01 NOTE — PROGRESS NOTES
[facility]  11/04/24       IMPRESSION/ PLAN   Mixed hyperlipidemia  -   continue  atorvastatin 20 MG  qhs.   -  exercises by walking 2-3 miles per day. Monitors diet.    - labs today     S/P MVR (mitral valve repair),    -     followed by dr Saenz at [facility] January.  Repeat TTE done 11/1;done q 3 years.      Enlarged prostate s/p exploratory in 4/2024.  TURP planned this month.      DDD (degenerative disc disease), cervical  -  minimal issues. No exacerbations    HEALTH MAINTENANCE: Dicussed needs COVID, RSV vaccine &Tdap as well.    Routine follow up 6 mo        CHIEF COMPLAINT  Chief Complaint   Patient presents with    Hyperlipidemia     Pt is here for 6 month f/u, is fasting     HISTORY OF PRESENT  ILLNESS  Akshat Feliz is a 69 y.o.  male   6 month routine follow up.  HLD-  without myalgias or abdominal pains. Walks 2 miles  daily  Non rheumatic Mitral Valve prolapse, regurg thus with MV repair 2006. Had repeat TTE  [facility], Followed  by Dr Amanda.   Enlarged prostate-  exploratory sx 4/2024 w Dr Cordon. TURP- scheduled 11/22. Wife's surgery  successful prior back surgeries a redo.  DDD cervical spine and Foraminal stenosis-  Surgery improved issues.  No new issues    ROS:  Remaining 14 ROS were reviewed and are unremarkable for other constitutional, EENT, cardiac, pulmonary, GI, , neurologic, musculoskeletal, or integumentary complaints.      PAST MEDICAL/SURGICAL, SOCIAL, &  FAMILY HISTORY:  Reviewed and updated accordingly.     ALLERGIES : Patient has no known allergies.    MEDICATIONS:  Current Outpatient Medications on File Prior to Visit   Medication Sig Dispense Refill    atorvastatin (LIPITOR) 20 MG tablet Take 1 tablet by mouth daily 90 tablet 3    aspirin 81 MG tablet Take 1 tablet by mouth daily       No current facility-administered medications on file prior to visit.        PHYSICAL EXAM     Vitals:    11/04/24 0733   BP: 122/74   Pulse: 68   Resp: 16  [facility] to continue home health

## 2024-11-04 ENCOUNTER — OFFICE VISIT (OUTPATIENT)
Dept: FAMILY MEDICINE CLINIC | Age: 69
End: 2024-11-04

## 2024-11-04 VITALS
BODY MASS INDEX: 24.89 KG/M2 | HEART RATE: 68 BPM | TEMPERATURE: 97.7 F | WEIGHT: 183.8 LBS | RESPIRATION RATE: 16 BRPM | SYSTOLIC BLOOD PRESSURE: 122 MMHG | DIASTOLIC BLOOD PRESSURE: 74 MMHG | OXYGEN SATURATION: 97 % | HEIGHT: 72 IN

## 2024-11-04 DIAGNOSIS — N40.0 ENLARGED PROSTATE: ICD-10-CM

## 2024-11-04 DIAGNOSIS — M50.30 DDD (DEGENERATIVE DISC DISEASE), CERVICAL: ICD-10-CM

## 2024-11-04 DIAGNOSIS — Z98.890 S/P MVR (MITRAL VALVE REPAIR): ICD-10-CM

## 2024-11-04 DIAGNOSIS — Z23 NEED FOR INFLUENZA VACCINATION: ICD-10-CM

## 2024-11-04 DIAGNOSIS — E78.2 MIXED HYPERLIPIDEMIA: Primary | ICD-10-CM

## 2024-11-04 LAB
ALBUMIN SERPL-MCNC: 4.4 G/DL (ref 3.4–5)
ALBUMIN/GLOB SERPL: 2.1 {RATIO} (ref 1.1–2.2)
ALP SERPL-CCNC: 65 U/L (ref 40–129)
ALT SERPL-CCNC: 24 U/L (ref 10–40)
ANION GAP SERPL CALCULATED.3IONS-SCNC: 9 MMOL/L (ref 3–16)
AST SERPL-CCNC: 21 U/L (ref 15–37)
BASOPHILS # BLD: 0.1 K/UL (ref 0–0.2)
BASOPHILS NFR BLD: 2 %
BILIRUB SERPL-MCNC: 0.6 MG/DL (ref 0–1)
BUN SERPL-MCNC: 23 MG/DL (ref 7–20)
CALCIUM SERPL-MCNC: 9.7 MG/DL (ref 8.3–10.6)
CHLORIDE SERPL-SCNC: 107 MMOL/L (ref 99–110)
CHOLEST SERPL-MCNC: 137 MG/DL (ref 0–199)
CO2 SERPL-SCNC: 25 MMOL/L (ref 21–32)
CREAT SERPL-MCNC: 0.9 MG/DL (ref 0.8–1.3)
DEPRECATED RDW RBC AUTO: 14.3 % (ref 12.4–15.4)
EOSINOPHIL # BLD: 0.1 K/UL (ref 0–0.6)
EOSINOPHIL NFR BLD: 2.1 %
GFR SERPLBLD CREATININE-BSD FMLA CKD-EPI: >90 ML/MIN/{1.73_M2}
GLUCOSE SERPL-MCNC: 82 MG/DL (ref 70–99)
HCT VFR BLD AUTO: 45.6 % (ref 40.5–52.5)
HDLC SERPL-MCNC: 60 MG/DL (ref 40–60)
HGB BLD-MCNC: 15.7 G/DL (ref 13.5–17.5)
LDLC SERPL CALC-MCNC: 62 MG/DL
LYMPHOCYTES # BLD: 2 K/UL (ref 1–5.1)
LYMPHOCYTES NFR BLD: 29.9 %
MCH RBC QN AUTO: 30.8 PG (ref 26–34)
MCHC RBC AUTO-ENTMCNC: 34.3 G/DL (ref 31–36)
MCV RBC AUTO: 89.7 FL (ref 80–100)
MONOCYTES # BLD: 0.7 K/UL (ref 0–1.3)
MONOCYTES NFR BLD: 10.9 %
NEUTROPHILS # BLD: 3.6 K/UL (ref 1.7–7.7)
NEUTROPHILS NFR BLD: 55.1 %
PLATELET # BLD AUTO: 210 K/UL (ref 135–450)
PMV BLD AUTO: 8.9 FL (ref 5–10.5)
POTASSIUM SERPL-SCNC: 5.5 MMOL/L (ref 3.5–5.1)
PROT SERPL-MCNC: 6.5 G/DL (ref 6.4–8.2)
RBC # BLD AUTO: 5.09 M/UL (ref 4.2–5.9)
SODIUM SERPL-SCNC: 141 MMOL/L (ref 136–145)
TRIGL SERPL-MCNC: 75 MG/DL (ref 0–150)
VLDLC SERPL CALC-MCNC: 15 MG/DL
WBC # BLD AUTO: 6.6 K/UL (ref 4–11)

## 2024-11-04 RX ORDER — ATORVASTATIN CALCIUM 20 MG/1
20 TABLET, FILM COATED ORAL DAILY
Qty: 90 TABLET | Refills: 3 | Status: SHIPPED | OUTPATIENT
Start: 2024-11-04

## 2024-11-04 ASSESSMENT — PATIENT HEALTH QUESTIONNAIRE - PHQ9
SUM OF ALL RESPONSES TO PHQ QUESTIONS 1-9: 0
1. LITTLE INTEREST OR PLEASURE IN DOING THINGS: NOT AT ALL
5. POOR APPETITE OR OVEREATING: NOT AT ALL
3. TROUBLE FALLING OR STAYING ASLEEP: NOT AT ALL
8. MOVING OR SPEAKING SO SLOWLY THAT OTHER PEOPLE COULD HAVE NOTICED. OR THE OPPOSITE, BEING SO FIGETY OR RESTLESS THAT YOU HAVE BEEN MOVING AROUND A LOT MORE THAN USUAL: NOT AT ALL
SUM OF ALL RESPONSES TO PHQ9 QUESTIONS 1 & 2: 0
7. TROUBLE CONCENTRATING ON THINGS, SUCH AS READING THE NEWSPAPER OR WATCHING TELEVISION: NOT AT ALL
6. FEELING BAD ABOUT YOURSELF - OR THAT YOU ARE A FAILURE OR HAVE LET YOURSELF OR YOUR FAMILY DOWN: NOT AT ALL
SUM OF ALL RESPONSES TO PHQ QUESTIONS 1-9: 0
10. IF YOU CHECKED OFF ANY PROBLEMS, HOW DIFFICULT HAVE THESE PROBLEMS MADE IT FOR YOU TO DO YOUR WORK, TAKE CARE OF THINGS AT HOME, OR GET ALONG WITH OTHER PEOPLE: NOT DIFFICULT AT ALL
SUM OF ALL RESPONSES TO PHQ QUESTIONS 1-9: 0
2. FEELING DOWN, DEPRESSED OR HOPELESS: NOT AT ALL
SUM OF ALL RESPONSES TO PHQ QUESTIONS 1-9: 0
4. FEELING TIRED OR HAVING LITTLE ENERGY: NOT AT ALL
9. THOUGHTS THAT YOU WOULD BE BETTER OFF DEAD, OR OF HURTING YOURSELF: NOT AT ALL

## 2024-11-04 NOTE — PROGRESS NOTES
Vaccine Information Sheet, \"Influenza - Inactivated\"  given to Akshat Feliz, or parent/legal guardian of  Akshat Feliz and verbalized understanding.    Patient responses:    Have you ever had a reaction to a flu vaccine? No  Do you have any current illness?  No  Have you ever had Guillian Dieterich Syndrome?  No  Do you have a serious allergy to any of the follow: Neomycin, Polymyxin, Thimerosal, eggs or egg products? No    Flu vaccine given per order. Please see immunization tab.    Risks and benefits explained.  Current VIS given.

## 2024-11-04 NOTE — PROGRESS NOTES
[facility]  [address], [address]   [place]  [address]  O: [phone]   F: [phone]    PRE- OPERATIVE HISTORY AND PHYSICAL    Today's Date: 11/06/24    Chief Complaint   Patient presents with    Pre-op Exam     Patient is here for a pre-op exam.  He is scheduled to have bladder surgery with  on 11/22 at [facility].       HISTORY OF PRESENT ILLNESS:       Akshat Feliz 1955 is a 69 y.o. male  with s/p MV repair, HYPERLIPIDEMIA, Degenerative back presents for pre-operative evaluation for:     PROCEDURE:  TURP     INDICATION FOR PROCEDURE:  enlarged prostate    DATE OF PROCEDURE: 11/22/2024    PHYSICIAN performing procedure  Dr. Bates     Planned anesthesia is:  general  History of adverse or allergic reaction to anesthesia?  No    Dentures:  None  Bleeding risk?:   no recent or remote history of abnormal bleeding  Previous transfusion/Complications: no      REVIEW OF SYSTEMS:    CONSTITUTIONAL:  negative  EYES:  negative  HEENT:  negative  RESPIRATORY:  negative  CARDIOVASCULAR:  negative  GASTROINTESTINAL:  negative  GENITOURINARY:  negative  INTEGUMENT/BREAST:  negative  HEMATOLOGIC/LYMPHATIC:  negative  ALLERGIC/IMMUNOLOGIC:  negative  ENDOCRINE:  negative  MUSCULOSKELETAL:  Negative    NEUROLOGICAL:  negative    PAST MEDICAL HISTORY:  Past Medical History:   Diagnosis Date    Adenomatous colon polyp 2009    Arthritis     Dyslipidemia     History of colon polyps     Hyperlipidemia     Status post mitral valve repair 2006       PROBLEM LIST:     Patient Active Problem List   Diagnosis    S/P MVR (mitral valve repair)    Mixed hyperlipidemia    Enlarged prostate    Elevated PSA    Carpal tunnel syndrome    DDD (degenerative disc disease), cervical       PAST SURGICAL HISTORY:  Past Surgical History:   Procedure Laterality Date    CARPAL TUNNEL RELEASE Right 3/29/2022    RIGHT CARPAL TUNNEL RELEASE performed by Scotty White MD at [facility] OR    COLONOSCOPY

## 2024-11-06 ENCOUNTER — OFFICE VISIT (OUTPATIENT)
Dept: FAMILY MEDICINE CLINIC | Age: 69
End: 2024-11-06

## 2024-11-06 VITALS
TEMPERATURE: 97.6 F | OXYGEN SATURATION: 98 % | HEIGHT: 72 IN | WEIGHT: 185.8 LBS | BODY MASS INDEX: 25.17 KG/M2 | SYSTOLIC BLOOD PRESSURE: 128 MMHG | HEART RATE: 70 BPM | DIASTOLIC BLOOD PRESSURE: 70 MMHG | RESPIRATION RATE: 16 BRPM

## 2024-11-06 DIAGNOSIS — R97.20 ELEVATED PSA: ICD-10-CM

## 2024-11-06 DIAGNOSIS — E87.5 SERUM POTASSIUM ELEVATED: ICD-10-CM

## 2024-11-06 DIAGNOSIS — E87.5 HYPERKALEMIA: ICD-10-CM

## 2024-11-06 DIAGNOSIS — M50.30 DDD (DEGENERATIVE DISC DISEASE), CERVICAL: ICD-10-CM

## 2024-11-06 DIAGNOSIS — E78.2 MIXED HYPERLIPIDEMIA: ICD-10-CM

## 2024-11-06 DIAGNOSIS — Z98.890 S/P MVR (MITRAL VALVE REPAIR): ICD-10-CM

## 2024-11-06 DIAGNOSIS — N40.0 ENLARGED PROSTATE: ICD-10-CM

## 2024-11-06 DIAGNOSIS — Z01.818 PREOP EXAMINATION: Primary | ICD-10-CM

## 2024-11-07 LAB — POTASSIUM SERPL-SCNC: 4.2 MMOL/L (ref 3.5–5.1)

## 2025-03-04 DIAGNOSIS — E78.2 MIXED HYPERLIPIDEMIA: ICD-10-CM

## 2025-03-04 DIAGNOSIS — Z98.890 S/P MVR (MITRAL VALVE REPAIR): ICD-10-CM

## 2025-03-04 DIAGNOSIS — N40.0 ENLARGED PROSTATE: ICD-10-CM

## 2025-03-04 RX ORDER — ATORVASTATIN CALCIUM 20 MG/1
TABLET, FILM COATED ORAL
Qty: 90 TABLET | Refills: 3 | Status: SHIPPED | OUTPATIENT
Start: 2025-03-04

## 2025-03-04 NOTE — TELEPHONE ENCOUNTER
Akshat Feliz is requesting refill(s) lipitor  Last OV 11/4/24 (pertaining to medication)  LR 11/4/24 (per medication requested)  Next office visit scheduled or attempted Yes   If no, reason:  5/5/25

## 2025-04-30 NOTE — PROGRESS NOTES
[facility]  05/05/25       IMPRESSION/ PLAN  1. S/P MVR (mitral valve repair)    2. Mixed hyperlipidemia    3. Enlarged prostate    4. DDD (degenerative disc disease), cervical          HYPERLIPIDEMIA  -   continue atorvastatin 20 MG  qhs. Congratulated on healthy lifestyle.      S/P MVR (mitral valve repair),    -     followed by dr Saenz at [facility] January.  Repeat TTE done 11/1; repeated q 3 years.   BPH      -s/p TURP and feeling better. Followup  w Dr Cordon.     DDD (degenerative disc disease), cervical  -  minimal issues. No exacerbations        Routine follow up 6 mo        CHIEF COMPLAINT  Chief Complaint   Patient presents with    Hyperlipidemia     6 month f/u, fasting     HISTORY OF PRESENT  ILLNESS  Akshat Feliz is a 70 y.o.  male   6 month routine follow up.  TURP done November, no abnormal cells found. Stream much better.   HYPERLIPIDEMIA- no myalgias on lipitor. Exercises regularly;walks 2.5 mils per day. Works on house daily.  MVRepair- followed by Nkechi Kirkpatrick. No changes.TTE repeated q 3 yrs.   No new back issues.   ROS:  Remaining 14 ROS were reviewed and are unremarkable for other constitutional, EENT, cardiac, pulmonary, GI, , neurologic, musculoskeletal, or integumentary complaints.      PAST MEDICAL/SURGICAL, SOCIAL, &  FAMILY HISTORY:  Reviewed and updated accordingly.     ALLERGIES : Patient has no known allergies.    MEDICATIONS:  Current Outpatient Medications on File Prior to Visit   Medication Sig Dispense Refill    atorvastatin (LIPITOR) 20 MG tablet TAKE 1 TABLET BY MOUTH EVERY NIGHT FOR HIGH CHOLESTEROL 90 tablet 3    aspirin 81 MG tablet Take 1 tablet by mouth daily       No current facility-administered medications on file prior to visit.        PHYSICAL EXAM     Vitals:    05/05/25 0740   BP: 122/70   Pulse: 64   Resp: 16   Temp: 97.6 °F (36.4 °C)   TempSrc: Temporal   SpO2: 97%   Weight: 83.8 kg (184 lb 12.8 oz)   Height: 1.829 m

## 2025-05-02 SDOH — ECONOMIC STABILITY: INCOME INSECURITY: IN THE LAST 12 MONTHS, WAS THERE A TIME WHEN YOU WERE NOT ABLE TO PAY THE MORTGAGE OR RENT ON TIME?: NO

## 2025-05-02 SDOH — ECONOMIC STABILITY: FOOD INSECURITY: WITHIN THE PAST 12 MONTHS, THE FOOD YOU BOUGHT JUST DIDN'T LAST AND YOU DIDN'T HAVE MONEY TO GET MORE.: NEVER TRUE

## 2025-05-02 SDOH — ECONOMIC STABILITY: FOOD INSECURITY: WITHIN THE PAST 12 MONTHS, YOU WORRIED THAT YOUR FOOD WOULD RUN OUT BEFORE YOU GOT MONEY TO BUY MORE.: NEVER TRUE

## 2025-05-02 SDOH — ECONOMIC STABILITY: TRANSPORTATION INSECURITY
IN THE PAST 12 MONTHS, HAS THE LACK OF TRANSPORTATION KEPT YOU FROM MEDICAL APPOINTMENTS OR FROM GETTING MEDICATIONS?: NO

## 2025-05-02 ASSESSMENT — PATIENT HEALTH QUESTIONNAIRE - PHQ9
SUM OF ALL RESPONSES TO PHQ QUESTIONS 1-9: 0
2. FEELING DOWN, DEPRESSED OR HOPELESS: NOT AT ALL
SUM OF ALL RESPONSES TO PHQ QUESTIONS 1-9: 0
2. FEELING DOWN, DEPRESSED OR HOPELESS: NOT AT ALL
5. POOR APPETITE OR OVEREATING: NOT AT ALL
4. FEELING TIRED OR HAVING LITTLE ENERGY: NOT AT ALL
SUM OF ALL RESPONSES TO PHQ QUESTIONS 1-9: 0
3. TROUBLE FALLING OR STAYING ASLEEP: NOT AT ALL
1. LITTLE INTEREST OR PLEASURE IN DOING THINGS: NOT AT ALL
SUM OF ALL RESPONSES TO PHQ QUESTIONS 1-9: 0
8. MOVING OR SPEAKING SO SLOWLY THAT OTHER PEOPLE COULD HAVE NOTICED. OR THE OPPOSITE, BEING SO FIGETY OR RESTLESS THAT YOU HAVE BEEN MOVING AROUND A LOT MORE THAN USUAL: NOT AT ALL
1. LITTLE INTEREST OR PLEASURE IN DOING THINGS: NOT AT ALL
9. THOUGHTS THAT YOU WOULD BE BETTER OFF DEAD, OR OF HURTING YOURSELF: NOT AT ALL
10. IF YOU CHECKED OFF ANY PROBLEMS, HOW DIFFICULT HAVE THESE PROBLEMS MADE IT FOR YOU TO DO YOUR WORK, TAKE CARE OF THINGS AT HOME, OR GET ALONG WITH OTHER PEOPLE: NOT DIFFICULT AT ALL
SUM OF ALL RESPONSES TO PHQ9 QUESTIONS 1 & 2: 0
6. FEELING BAD ABOUT YOURSELF - OR THAT YOU ARE A FAILURE OR HAVE LET YOURSELF OR YOUR FAMILY DOWN: NOT AT ALL
7. TROUBLE CONCENTRATING ON THINGS, SUCH AS READING THE NEWSPAPER OR WATCHING TELEVISION: NOT AT ALL

## 2025-05-05 ENCOUNTER — OFFICE VISIT (OUTPATIENT)
Dept: FAMILY MEDICINE CLINIC | Age: 70
End: 2025-05-05
Payer: COMMERCIAL

## 2025-05-05 VITALS
SYSTOLIC BLOOD PRESSURE: 122 MMHG | TEMPERATURE: 97.6 F | BODY MASS INDEX: 25.03 KG/M2 | RESPIRATION RATE: 16 BRPM | HEART RATE: 64 BPM | OXYGEN SATURATION: 97 % | DIASTOLIC BLOOD PRESSURE: 70 MMHG | WEIGHT: 184.8 LBS | HEIGHT: 72 IN

## 2025-05-05 DIAGNOSIS — E78.2 MIXED HYPERLIPIDEMIA: ICD-10-CM

## 2025-05-05 DIAGNOSIS — N40.0 ENLARGED PROSTATE: ICD-10-CM

## 2025-05-05 DIAGNOSIS — M50.30 DDD (DEGENERATIVE DISC DISEASE), CERVICAL: ICD-10-CM

## 2025-05-05 DIAGNOSIS — Z98.890 S/P MVR (MITRAL VALVE REPAIR): Primary | ICD-10-CM

## 2025-05-05 PROCEDURE — 1123F ACP DISCUSS/DSCN MKR DOCD: CPT | Performed by: PHYSICIAN ASSISTANT

## 2025-05-05 PROCEDURE — 99214 OFFICE O/P EST MOD 30 MIN: CPT | Performed by: PHYSICIAN ASSISTANT

## 2025-05-05 NOTE — PATIENT INSTRUCTIONS
Healthy Living Recommendations 2025:  Exercise 150 minutes/ week: Aerobic and Weights Aerobic exercise strengthens muscle while weights and resistance strengthens bone. Body Mass Index (BMI) goal is 25.     Nutrition: Avoid salting foods. Limit caffeine to less than 3 cups caffeine/day. Limit carbohydrates like breads, rice, and pastas. Limit sugar intake. Avoid processed and fried foods. Eat baked or broiled foods. One can never have too many vegetables and fruits which are good fiber source. Fiber lowers glucose levels. Studies show diets high in red meat and processed foods WILL increase the risk heart and liver diseases, cancers, and dementia.   Sugar : Female: Max 6 tsp or 24 grams/ day               Male: Max 9 tsp or 28 grams/ day  Protein:   Protects immune system, regulate hormones,and builds muscle. High protein foods: [place], Greek yogurt, Chicken, Lentils, Eggs  Calcium/ Vit D3  Stimulates bone growth. Promotes digestion, kidneys, and mental health.   High calcium foods:  milk, yogurt, cheese, beans, dark green leafy vegetables. High Vitamin D foods:  salmon, tuna fish, fortified cereals, mushrooms.  Tobacco: Avoid all smoking and vapping.      Eye exam every 2 years after age 40.   Dental; Brush twice a day. Floss daily. Dentist exam every 6 months.  Noise: recurrent LOUD noise WILL result in hearing loss. If wearing Earbuds or headphones, keep volume below 50%. Just 5 minutes at 100% volume will result in permanent hearing loss.   Colonoscopy Begin at age 45 or sooner if family history.    Skin:  Perform monthly skin check for changes.  Breasts/Mammograms: Perform monthly self-breast exam for changes. FM: Yearly mammograms begin age 40. Repeat yearly.  Males   perform monthly self-testicular exam for changes.  Urinary changes can be a sign of prostate issues.      Have the Tdap vaccine.     [facility]   [address], [address], [place] [address]  Office hours: Monday -

## (undated) DEVICE — ZIMMER® STERILE DISPOSABLE TOURNIQUET CUFF WITH PLC, DUAL PORT, SINGLE BLADDER, 18 IN. (46 CM)

## (undated) DEVICE — GLOVE SURG SZ 8 L12IN FNGR THK94MIL STD WHT LTX FREE

## (undated) DEVICE — DRESSING PETRO W7.6XL7.6CM CELOS ACETT NONADHERING MESH

## (undated) DEVICE — SET GRAV VENT NVENT CK VLV 3 NDL FREE PRT 10 GTT

## (undated) DEVICE — GOWN,SIRUS,NON REINFRCD,LARGE,SET IN SL: Brand: MEDLINE

## (undated) DEVICE — 3M™ TEGADERM™ TRANSPARENT FILM DRESSING FRAME STYLE, 1624W, 2-3/8 IN X 2-3/4 IN (6 CM X 7 CM), 100/CT 4CT/CASE: Brand: 3M™ TEGADERM™

## (undated) DEVICE — CATHETER IV 20GA L1.25IN PNK FEP SFTY STR HUB RADPQ DISP

## (undated) DEVICE — NEEDLE HYPO 25GA L1.5IN BLU POLYPR HUB S STL REG BVL STR

## (undated) DEVICE — Device

## (undated) DEVICE — NEEDLE HYPO 18GA L1.5IN THN WALL PIVOTING SHLD BVL ORIENTED

## (undated) DEVICE — SET ADMIN PRIMING 7ML L30IN 7.35LB 20 GTT 2ND RLER CLMP

## (undated) DEVICE — DRAPE HND W114XL142IN BLU POLYPR W O PCH FEN CRD AND TB HLDR

## (undated) DEVICE — UNDERGLOVE SURG SZ 8 FNGR THK0.21MIL GRN LTX BEAD CUF

## (undated) DEVICE — SOLUTION IV 1000ML LAC RINGERS PH 6.5 INJ USP VIAFLX PLAS

## (undated) DEVICE — SOLUTION IV IRRIG 500ML 0.9% SODIUM CHL 2F7123

## (undated) DEVICE — SUTURE CHROMIC GUT SZ 4-0 L18IN ABSRB BRN L19MM PS-2 3/8 1637G

## (undated) DEVICE — GLOVE,SURG,SENSICARE,ALOE,LF,PF,7: Brand: MEDLINE

## (undated) DEVICE — GLOVE ORANGE PI 7 1/2   MSG9075

## (undated) DEVICE — CORD ES L12FT BPLR FRCP

## (undated) DEVICE — SYRINGE MED 10ML LUERLOCK TIP W/O SFTY DISP

## (undated) DEVICE — BANDAGE COMPR W2INXL5YD TAN BRTH SELF ADH WRP W/ HND TEAR